# Patient Record
Sex: MALE | Race: WHITE | NOT HISPANIC OR LATINO | Employment: FULL TIME | URBAN - METROPOLITAN AREA
[De-identification: names, ages, dates, MRNs, and addresses within clinical notes are randomized per-mention and may not be internally consistent; named-entity substitution may affect disease eponyms.]

---

## 2017-01-06 ENCOUNTER — AMBULATORY - HEALTHEAST (OUTPATIENT)
Dept: CARDIOLOGY | Facility: CLINIC | Age: 67
End: 2017-01-06

## 2017-01-06 DIAGNOSIS — I23.6 LEFT VENTRICULAR THROMBUS FOLLOWING MI (H): ICD-10-CM

## 2017-01-06 DIAGNOSIS — I47.29 PAROXYSMAL VENTRICULAR TACHYCARDIA (H): ICD-10-CM

## 2017-01-20 ENCOUNTER — AMBULATORY - HEALTHEAST (OUTPATIENT)
Dept: CARDIOLOGY | Facility: CLINIC | Age: 67
End: 2017-01-20

## 2017-01-20 DIAGNOSIS — I47.29 PAROXYSMAL VENTRICULAR TACHYCARDIA (H): ICD-10-CM

## 2017-01-20 DIAGNOSIS — I23.6 LEFT VENTRICULAR THROMBUS FOLLOWING MI (H): ICD-10-CM

## 2017-01-21 ENCOUNTER — COMMUNICATION - HEALTHEAST (OUTPATIENT)
Dept: CARDIOLOGY | Facility: CLINIC | Age: 67
End: 2017-01-21

## 2017-01-23 ENCOUNTER — COMMUNICATION - HEALTHEAST (OUTPATIENT)
Dept: CARDIOLOGY | Facility: CLINIC | Age: 67
End: 2017-01-23

## 2017-01-23 DIAGNOSIS — I48.0 PAROXYSMAL ATRIAL FIBRILLATION (H): ICD-10-CM

## 2017-02-03 ENCOUNTER — AMBULATORY - HEALTHEAST (OUTPATIENT)
Dept: CARDIOLOGY | Facility: CLINIC | Age: 67
End: 2017-02-03

## 2017-02-03 DIAGNOSIS — I23.6 LEFT VENTRICULAR THROMBUS FOLLOWING MI (H): ICD-10-CM

## 2017-02-03 DIAGNOSIS — I48.0 PAROXYSMAL ATRIAL FIBRILLATION (H): ICD-10-CM

## 2017-02-03 DIAGNOSIS — I47.29 PAROXYSMAL VENTRICULAR TACHYCARDIA (H): ICD-10-CM

## 2017-03-01 ENCOUNTER — COMMUNICATION - HEALTHEAST (OUTPATIENT)
Dept: INTERNAL MEDICINE | Facility: CLINIC | Age: 67
End: 2017-03-01

## 2017-03-03 ENCOUNTER — AMBULATORY - HEALTHEAST (OUTPATIENT)
Dept: CARDIOLOGY | Facility: CLINIC | Age: 67
End: 2017-03-03

## 2017-03-03 DIAGNOSIS — I47.29 PAROXYSMAL VENTRICULAR TACHYCARDIA (H): ICD-10-CM

## 2017-03-03 DIAGNOSIS — I23.6 LEFT VENTRICULAR THROMBUS FOLLOWING MI (H): ICD-10-CM

## 2017-03-06 ENCOUNTER — COMMUNICATION - HEALTHEAST (OUTPATIENT)
Dept: INTERNAL MEDICINE | Facility: CLINIC | Age: 67
End: 2017-03-06

## 2017-03-20 ENCOUNTER — COMMUNICATION - HEALTHEAST (OUTPATIENT)
Dept: CARDIOLOGY | Facility: CLINIC | Age: 67
End: 2017-03-20

## 2017-03-23 ENCOUNTER — AMBULATORY - HEALTHEAST (OUTPATIENT)
Dept: CARDIOLOGY | Facility: CLINIC | Age: 67
End: 2017-03-23

## 2017-03-23 ENCOUNTER — OFFICE VISIT - HEALTHEAST (OUTPATIENT)
Dept: CARDIOLOGY | Facility: CLINIC | Age: 67
End: 2017-03-23

## 2017-03-23 DIAGNOSIS — E78.2 MIXED HYPERLIPIDEMIA: ICD-10-CM

## 2017-03-23 DIAGNOSIS — I25.5 ISCHEMIC CARDIOMYOPATHY: ICD-10-CM

## 2017-03-23 DIAGNOSIS — Z95.810 ICD (IMPLANTABLE CARDIOVERTER-DEFIBRILLATOR), DUAL, IN SITU: ICD-10-CM

## 2017-03-23 DIAGNOSIS — I25.10 CORONARY ARTERY DISEASE DUE TO LIPID RICH PLAQUE: ICD-10-CM

## 2017-03-23 DIAGNOSIS — I25.83 CORONARY ARTERY DISEASE DUE TO LIPID RICH PLAQUE: ICD-10-CM

## 2017-03-23 DIAGNOSIS — I50.22 CHRONIC SYSTOLIC CHF (CONGESTIVE HEART FAILURE), NYHA CLASS 2 (H): ICD-10-CM

## 2017-03-23 ASSESSMENT — MIFFLIN-ST. JEOR: SCORE: 1824.06

## 2017-03-31 ENCOUNTER — AMBULATORY - HEALTHEAST (OUTPATIENT)
Dept: CARDIOLOGY | Facility: CLINIC | Age: 67
End: 2017-03-31

## 2017-03-31 DIAGNOSIS — I47.29 PAROXYSMAL VENTRICULAR TACHYCARDIA (H): ICD-10-CM

## 2017-03-31 DIAGNOSIS — I23.6 LEFT VENTRICULAR THROMBUS FOLLOWING MI (H): ICD-10-CM

## 2017-04-28 ENCOUNTER — AMBULATORY - HEALTHEAST (OUTPATIENT)
Dept: CARDIOLOGY | Facility: CLINIC | Age: 67
End: 2017-04-28

## 2017-04-28 DIAGNOSIS — I23.6 LEFT VENTRICULAR THROMBUS FOLLOWING MI (H): ICD-10-CM

## 2017-04-28 DIAGNOSIS — I47.29 PAROXYSMAL VENTRICULAR TACHYCARDIA (H): ICD-10-CM

## 2017-05-12 ENCOUNTER — AMBULATORY - HEALTHEAST (OUTPATIENT)
Dept: CARDIOLOGY | Facility: CLINIC | Age: 67
End: 2017-05-12

## 2017-05-12 DIAGNOSIS — I47.29 PAROXYSMAL VENTRICULAR TACHYCARDIA (H): ICD-10-CM

## 2017-05-12 DIAGNOSIS — I23.6 LEFT VENTRICULAR THROMBUS FOLLOWING MI (H): ICD-10-CM

## 2017-05-22 ENCOUNTER — AMBULATORY - HEALTHEAST (OUTPATIENT)
Dept: CARDIOLOGY | Facility: CLINIC | Age: 67
End: 2017-05-22

## 2017-05-22 DIAGNOSIS — I47.29 PAROXYSMAL VENTRICULAR TACHYCARDIA (H): ICD-10-CM

## 2017-05-22 DIAGNOSIS — I23.6 LEFT VENTRICULAR THROMBUS FOLLOWING MI (H): ICD-10-CM

## 2017-06-15 ENCOUNTER — HOSPITAL ENCOUNTER (OUTPATIENT)
Dept: PHYSICAL MEDICINE AND REHAB | Facility: CLINIC | Age: 67
Discharge: HOME | End: 2017-06-15
Attending: NURSE PRACTITIONER

## 2017-06-15 DIAGNOSIS — M54.16 LUMBAR RADICULOPATHY, CHRONIC: ICD-10-CM

## 2017-06-15 DIAGNOSIS — G89.29 CHRONIC RIGHT-SIDED LOW BACK PAIN WITHOUT SCIATICA: ICD-10-CM

## 2017-06-15 DIAGNOSIS — M54.50 CHRONIC RIGHT-SIDED LOW BACK PAIN WITHOUT SCIATICA: ICD-10-CM

## 2017-06-15 DIAGNOSIS — M43.06 PARS DEFECT OF LUMBAR SPINE: ICD-10-CM

## 2017-06-16 ENCOUNTER — HOSPITAL ENCOUNTER (OUTPATIENT)
Dept: RADIOLOGY | Facility: CLINIC | Age: 67
Discharge: HOME | End: 2017-06-16

## 2017-06-16 ENCOUNTER — HOSPITAL ENCOUNTER (OUTPATIENT)
Dept: CT IMAGING | Facility: CLINIC | Age: 67
Discharge: HOME | End: 2017-06-16

## 2017-06-16 DIAGNOSIS — M54.16 LUMBAR RADICULOPATHY, CHRONIC: ICD-10-CM

## 2017-06-16 DIAGNOSIS — M54.50 CHRONIC RIGHT-SIDED LOW BACK PAIN WITHOUT SCIATICA: ICD-10-CM

## 2017-06-16 DIAGNOSIS — G89.29 CHRONIC RIGHT-SIDED LOW BACK PAIN WITHOUT SCIATICA: ICD-10-CM

## 2017-06-16 DIAGNOSIS — M43.06 PARS DEFECT OF LUMBAR SPINE: ICD-10-CM

## 2017-06-21 ENCOUNTER — AMBULATORY - HEALTHEAST (OUTPATIENT)
Dept: CARDIOLOGY | Facility: CLINIC | Age: 67
End: 2017-06-21

## 2017-06-21 DIAGNOSIS — Z95.810 ICD (IMPLANTABLE CARDIOVERTER-DEFIBRILLATOR), DUAL, IN SITU: ICD-10-CM

## 2017-06-21 LAB — HCC DEVICE COMMENTS: NORMAL

## 2017-06-22 ENCOUNTER — HOSPITAL ENCOUNTER (OUTPATIENT)
Dept: PHYSICAL MEDICINE AND REHAB | Facility: CLINIC | Age: 67
Discharge: HOME | End: 2017-06-22
Attending: NURSE PRACTITIONER

## 2017-06-22 DIAGNOSIS — M54.16 LUMBAR RADICULOPATHY, CHRONIC: ICD-10-CM

## 2017-06-22 DIAGNOSIS — Z79.01 ANTICOAGULANT LONG-TERM USE: ICD-10-CM

## 2017-06-22 DIAGNOSIS — M54.50 CHRONIC RIGHT-SIDED LOW BACK PAIN WITHOUT SCIATICA: ICD-10-CM

## 2017-06-22 DIAGNOSIS — G89.29 CHRONIC RIGHT-SIDED LOW BACK PAIN WITHOUT SCIATICA: ICD-10-CM

## 2017-06-22 DIAGNOSIS — M43.06 PARS DEFECT OF LUMBAR SPINE: ICD-10-CM

## 2017-06-22 DIAGNOSIS — M43.16 SPONDYLOLISTHESIS, LUMBAR REGION: ICD-10-CM

## 2017-06-26 ENCOUNTER — AMBULATORY - HEALTHEAST (OUTPATIENT)
Dept: CARDIOLOGY | Facility: CLINIC | Age: 67
End: 2017-06-26

## 2017-06-26 DIAGNOSIS — I23.6 LEFT VENTRICULAR THROMBUS FOLLOWING MI (H): ICD-10-CM

## 2017-06-26 DIAGNOSIS — I47.29 PAROXYSMAL VENTRICULAR TACHYCARDIA (H): ICD-10-CM

## 2017-07-06 ENCOUNTER — OFFICE VISIT - HEALTHEAST (OUTPATIENT)
Dept: PHYSICAL THERAPY | Facility: CLINIC | Age: 67
End: 2017-07-06

## 2017-07-06 DIAGNOSIS — M54.41 CHRONIC BILATERAL LOW BACK PAIN WITH BILATERAL SCIATICA: ICD-10-CM

## 2017-07-06 DIAGNOSIS — M62.81 GENERALIZED MUSCLE WEAKNESS: ICD-10-CM

## 2017-07-06 DIAGNOSIS — G89.29 CHRONIC BILATERAL LOW BACK PAIN WITH BILATERAL SCIATICA: ICD-10-CM

## 2017-07-06 DIAGNOSIS — M54.42 CHRONIC BILATERAL LOW BACK PAIN WITH BILATERAL SCIATICA: ICD-10-CM

## 2017-07-06 DIAGNOSIS — R29.3 ABNORMAL POSTURE: ICD-10-CM

## 2017-07-10 ENCOUNTER — OFFICE VISIT - HEALTHEAST (OUTPATIENT)
Dept: PHYSICAL THERAPY | Facility: CLINIC | Age: 67
End: 2017-07-10

## 2017-07-10 DIAGNOSIS — G89.29 CHRONIC BILATERAL LOW BACK PAIN WITH BILATERAL SCIATICA: ICD-10-CM

## 2017-07-10 DIAGNOSIS — R29.3 ABNORMAL POSTURE: ICD-10-CM

## 2017-07-10 DIAGNOSIS — M54.41 CHRONIC BILATERAL LOW BACK PAIN WITH BILATERAL SCIATICA: ICD-10-CM

## 2017-07-10 DIAGNOSIS — M62.81 GENERALIZED MUSCLE WEAKNESS: ICD-10-CM

## 2017-07-10 DIAGNOSIS — M54.42 CHRONIC BILATERAL LOW BACK PAIN WITH BILATERAL SCIATICA: ICD-10-CM

## 2017-07-13 ENCOUNTER — OFFICE VISIT - HEALTHEAST (OUTPATIENT)
Dept: PHYSICAL THERAPY | Facility: CLINIC | Age: 67
End: 2017-07-13

## 2017-07-13 DIAGNOSIS — M62.81 GENERALIZED MUSCLE WEAKNESS: ICD-10-CM

## 2017-07-13 DIAGNOSIS — M54.42 CHRONIC BILATERAL LOW BACK PAIN WITH BILATERAL SCIATICA: ICD-10-CM

## 2017-07-13 DIAGNOSIS — G89.29 CHRONIC BILATERAL LOW BACK PAIN WITH BILATERAL SCIATICA: ICD-10-CM

## 2017-07-13 DIAGNOSIS — M54.41 CHRONIC BILATERAL LOW BACK PAIN WITH BILATERAL SCIATICA: ICD-10-CM

## 2017-07-13 DIAGNOSIS — R29.3 ABNORMAL POSTURE: ICD-10-CM

## 2017-07-17 ENCOUNTER — OFFICE VISIT - HEALTHEAST (OUTPATIENT)
Dept: PHYSICAL THERAPY | Facility: CLINIC | Age: 67
End: 2017-07-17

## 2017-07-17 DIAGNOSIS — G89.29 CHRONIC BILATERAL LOW BACK PAIN WITH BILATERAL SCIATICA: ICD-10-CM

## 2017-07-17 DIAGNOSIS — M54.42 CHRONIC BILATERAL LOW BACK PAIN WITH BILATERAL SCIATICA: ICD-10-CM

## 2017-07-17 DIAGNOSIS — M54.41 CHRONIC BILATERAL LOW BACK PAIN WITH BILATERAL SCIATICA: ICD-10-CM

## 2017-07-17 DIAGNOSIS — R29.3 ABNORMAL POSTURE: ICD-10-CM

## 2017-07-17 DIAGNOSIS — M62.81 GENERALIZED MUSCLE WEAKNESS: ICD-10-CM

## 2017-07-27 ENCOUNTER — OFFICE VISIT - HEALTHEAST (OUTPATIENT)
Dept: PHYSICAL THERAPY | Facility: CLINIC | Age: 67
End: 2017-07-27

## 2017-07-27 DIAGNOSIS — R29.3 ABNORMAL POSTURE: ICD-10-CM

## 2017-07-27 DIAGNOSIS — G89.29 CHRONIC BILATERAL LOW BACK PAIN WITH BILATERAL SCIATICA: ICD-10-CM

## 2017-07-27 DIAGNOSIS — M54.42 CHRONIC BILATERAL LOW BACK PAIN WITH BILATERAL SCIATICA: ICD-10-CM

## 2017-07-27 DIAGNOSIS — M54.41 CHRONIC BILATERAL LOW BACK PAIN WITH BILATERAL SCIATICA: ICD-10-CM

## 2017-07-27 DIAGNOSIS — M62.81 GENERALIZED MUSCLE WEAKNESS: ICD-10-CM

## 2017-07-28 ENCOUNTER — AMBULATORY - HEALTHEAST (OUTPATIENT)
Dept: CARDIOLOGY | Facility: CLINIC | Age: 67
End: 2017-07-28

## 2017-07-28 DIAGNOSIS — I47.29 PAROXYSMAL VENTRICULAR TACHYCARDIA (H): ICD-10-CM

## 2017-07-28 DIAGNOSIS — I23.6 LEFT VENTRICULAR THROMBUS FOLLOWING MI (H): ICD-10-CM

## 2017-08-10 ENCOUNTER — OFFICE VISIT - HEALTHEAST (OUTPATIENT)
Dept: PHYSICAL THERAPY | Facility: CLINIC | Age: 67
End: 2017-08-10

## 2017-08-10 DIAGNOSIS — M62.81 GENERALIZED MUSCLE WEAKNESS: ICD-10-CM

## 2017-08-10 DIAGNOSIS — R29.3 ABNORMAL POSTURE: ICD-10-CM

## 2017-08-10 DIAGNOSIS — M54.42 CHRONIC BILATERAL LOW BACK PAIN WITH BILATERAL SCIATICA: ICD-10-CM

## 2017-08-10 DIAGNOSIS — G89.29 CHRONIC BILATERAL LOW BACK PAIN WITH BILATERAL SCIATICA: ICD-10-CM

## 2017-08-10 DIAGNOSIS — M54.41 CHRONIC BILATERAL LOW BACK PAIN WITH BILATERAL SCIATICA: ICD-10-CM

## 2017-08-25 ENCOUNTER — AMBULATORY - HEALTHEAST (OUTPATIENT)
Dept: CARDIOLOGY | Facility: CLINIC | Age: 67
End: 2017-08-25

## 2017-08-25 DIAGNOSIS — I47.29 PAROXYSMAL VENTRICULAR TACHYCARDIA (H): ICD-10-CM

## 2017-08-25 DIAGNOSIS — I23.6 LEFT VENTRICULAR THROMBUS FOLLOWING MI (H): ICD-10-CM

## 2017-08-30 ENCOUNTER — COMMUNICATION - HEALTHEAST (OUTPATIENT)
Dept: INTERNAL MEDICINE | Facility: CLINIC | Age: 67
End: 2017-08-30

## 2017-08-30 ENCOUNTER — COMMUNICATION - HEALTHEAST (OUTPATIENT)
Dept: CARDIOLOGY | Facility: CLINIC | Age: 67
End: 2017-08-30

## 2017-08-30 DIAGNOSIS — I25.5 ISCHEMIC CARDIOMYOPATHY: ICD-10-CM

## 2017-09-08 ENCOUNTER — AMBULATORY - HEALTHEAST (OUTPATIENT)
Dept: CARDIOLOGY | Facility: CLINIC | Age: 67
End: 2017-09-08

## 2017-09-08 DIAGNOSIS — I47.29 PAROXYSMAL VENTRICULAR TACHYCARDIA (H): ICD-10-CM

## 2017-09-08 DIAGNOSIS — I23.6 LEFT VENTRICULAR THROMBUS FOLLOWING MI (H): ICD-10-CM

## 2017-09-22 ENCOUNTER — COMMUNICATION - HEALTHEAST (OUTPATIENT)
Dept: PHYSICAL MEDICINE AND REHAB | Facility: CLINIC | Age: 67
End: 2017-09-22

## 2017-09-25 ENCOUNTER — AMBULATORY - HEALTHEAST (OUTPATIENT)
Dept: PHYSICAL MEDICINE AND REHAB | Facility: CLINIC | Age: 67
End: 2017-09-25

## 2017-09-25 DIAGNOSIS — M43.16 SPONDYLOLISTHESIS, LUMBAR REGION: ICD-10-CM

## 2017-09-25 DIAGNOSIS — M43.06 PARS DEFECT OF LUMBAR SPINE: ICD-10-CM

## 2017-09-25 DIAGNOSIS — M54.50 CHRONIC RIGHT-SIDED LOW BACK PAIN WITHOUT SCIATICA: ICD-10-CM

## 2017-09-25 DIAGNOSIS — G89.29 CHRONIC RIGHT-SIDED LOW BACK PAIN WITHOUT SCIATICA: ICD-10-CM

## 2017-09-26 ENCOUNTER — RECORDS - HEALTHEAST (OUTPATIENT)
Dept: ADMINISTRATIVE | Facility: OTHER | Age: 67
End: 2017-09-26

## 2017-09-27 ENCOUNTER — AMBULATORY - HEALTHEAST (OUTPATIENT)
Dept: CARDIOLOGY | Facility: CLINIC | Age: 67
End: 2017-09-27

## 2017-09-27 DIAGNOSIS — Z95.810 ICD (IMPLANTABLE CARDIOVERTER-DEFIBRILLATOR), DUAL, IN SITU: ICD-10-CM

## 2017-09-27 LAB — HCC DEVICE COMMENTS: NORMAL

## 2017-09-29 ENCOUNTER — COMMUNICATION - HEALTHEAST (OUTPATIENT)
Dept: CARDIOLOGY | Facility: CLINIC | Age: 67
End: 2017-09-29

## 2017-09-29 ENCOUNTER — HOSPITAL ENCOUNTER (OUTPATIENT)
Dept: ULTRASOUND IMAGING | Facility: CLINIC | Age: 67
Discharge: HOME | End: 2017-09-29
Attending: PHYSICIAN ASSISTANT

## 2017-09-29 ENCOUNTER — OFFICE VISIT - HEALTHEAST (OUTPATIENT)
Dept: CARDIOLOGY | Facility: CLINIC | Age: 67
End: 2017-09-29

## 2017-09-29 DIAGNOSIS — M54.50 LOW BACK PAIN: ICD-10-CM

## 2017-09-29 DIAGNOSIS — I73.9 VASCULAR CLAUDICATION (H): ICD-10-CM

## 2017-09-29 DIAGNOSIS — I25.83 CORONARY ARTERY DISEASE DUE TO LIPID RICH PLAQUE: ICD-10-CM

## 2017-09-29 DIAGNOSIS — I25.10 CORONARY ARTERY DISEASE DUE TO LIPID RICH PLAQUE: ICD-10-CM

## 2017-09-29 DIAGNOSIS — I25.5 CARDIOMYOPATHY, ISCHEMIC: ICD-10-CM

## 2017-10-04 ENCOUNTER — RECORDS - HEALTHEAST (OUTPATIENT)
Dept: ADMINISTRATIVE | Facility: OTHER | Age: 67
End: 2017-10-04

## 2017-10-06 ENCOUNTER — AMBULATORY - HEALTHEAST (OUTPATIENT)
Dept: CARDIOLOGY | Facility: CLINIC | Age: 67
End: 2017-10-06

## 2017-10-06 DIAGNOSIS — I47.29 PAROXYSMAL VENTRICULAR TACHYCARDIA (H): ICD-10-CM

## 2017-10-06 DIAGNOSIS — I23.6 LEFT VENTRICULAR THROMBUS FOLLOWING MI (H): ICD-10-CM

## 2017-10-13 ENCOUNTER — AMBULATORY - HEALTHEAST (OUTPATIENT)
Dept: CARDIOLOGY | Facility: CLINIC | Age: 67
End: 2017-10-13

## 2017-10-13 DIAGNOSIS — I47.29 PAROXYSMAL VENTRICULAR TACHYCARDIA (H): ICD-10-CM

## 2017-10-13 DIAGNOSIS — I23.6 LEFT VENTRICULAR THROMBUS FOLLOWING MI (H): ICD-10-CM

## 2017-10-21 ENCOUNTER — COMMUNICATION - HEALTHEAST (OUTPATIENT)
Dept: INTERNAL MEDICINE | Facility: CLINIC | Age: 67
End: 2017-10-21

## 2017-10-21 DIAGNOSIS — I25.10 CAD (CORONARY ARTERY DISEASE): ICD-10-CM

## 2017-10-30 ENCOUNTER — RECORDS - HEALTHEAST (OUTPATIENT)
Dept: ADMINISTRATIVE | Facility: OTHER | Age: 67
End: 2017-10-30

## 2017-11-03 ENCOUNTER — AMBULATORY - HEALTHEAST (OUTPATIENT)
Dept: CARDIOLOGY | Facility: CLINIC | Age: 67
End: 2017-11-03

## 2017-11-03 DIAGNOSIS — I23.6 LEFT VENTRICULAR THROMBUS FOLLOWING MI (H): ICD-10-CM

## 2017-11-03 DIAGNOSIS — I47.29 PAROXYSMAL VENTRICULAR TACHYCARDIA (H): ICD-10-CM

## 2017-11-17 ENCOUNTER — AMBULATORY - HEALTHEAST (OUTPATIENT)
Dept: CARDIOLOGY | Facility: CLINIC | Age: 67
End: 2017-11-17

## 2017-11-17 DIAGNOSIS — I47.29 PAROXYSMAL VENTRICULAR TACHYCARDIA (H): ICD-10-CM

## 2017-11-17 DIAGNOSIS — I23.6 LEFT VENTRICULAR THROMBUS FOLLOWING MI (H): ICD-10-CM

## 2017-12-08 ENCOUNTER — AMBULATORY - HEALTHEAST (OUTPATIENT)
Dept: CARDIOLOGY | Facility: CLINIC | Age: 67
End: 2017-12-08

## 2017-12-08 DIAGNOSIS — I23.6 LEFT VENTRICULAR THROMBUS FOLLOWING MI (H): ICD-10-CM

## 2017-12-08 DIAGNOSIS — I47.29 PAROXYSMAL VENTRICULAR TACHYCARDIA (H): ICD-10-CM

## 2017-12-16 ENCOUNTER — COMMUNICATION - HEALTHEAST (OUTPATIENT)
Dept: CARDIOLOGY | Facility: CLINIC | Age: 67
End: 2017-12-16

## 2018-01-04 ENCOUNTER — AMBULATORY - HEALTHEAST (OUTPATIENT)
Dept: CARDIOLOGY | Facility: CLINIC | Age: 68
End: 2018-01-04

## 2018-01-04 DIAGNOSIS — Z95.810 ICD (IMPLANTABLE CARDIOVERTER-DEFIBRILLATOR), DUAL, IN SITU: ICD-10-CM

## 2018-01-04 LAB — HCC DEVICE COMMENTS: NORMAL

## 2018-01-08 ENCOUNTER — AMBULATORY - HEALTHEAST (OUTPATIENT)
Dept: CARDIOLOGY | Facility: CLINIC | Age: 68
End: 2018-01-08

## 2018-01-08 DIAGNOSIS — I47.29 PAROXYSMAL VENTRICULAR TACHYCARDIA (H): ICD-10-CM

## 2018-01-08 DIAGNOSIS — I23.6 LEFT VENTRICULAR THROMBUS FOLLOWING MI (H): ICD-10-CM

## 2018-01-08 LAB — INTERNATIONAL NORMALIZATION RATIO, POC - HISTORICAL: 3

## 2018-02-05 ENCOUNTER — AMBULATORY - HEALTHEAST (OUTPATIENT)
Dept: CARDIOLOGY | Facility: CLINIC | Age: 68
End: 2018-02-05

## 2018-02-05 ENCOUNTER — COMMUNICATION - HEALTHEAST (OUTPATIENT)
Dept: CARDIOLOGY | Facility: CLINIC | Age: 68
End: 2018-02-05

## 2018-02-05 DIAGNOSIS — I23.6 LEFT VENTRICULAR THROMBUS FOLLOWING MI (H): ICD-10-CM

## 2018-02-05 DIAGNOSIS — I48.0 PAROXYSMAL ATRIAL FIBRILLATION (H): ICD-10-CM

## 2018-02-05 DIAGNOSIS — I47.29 PAROXYSMAL VENTRICULAR TACHYCARDIA (H): ICD-10-CM

## 2018-02-05 LAB — INTERNATIONAL NORMALIZATION RATIO, POC - HISTORICAL: 2

## 2018-02-12 ENCOUNTER — COMMUNICATION - HEALTHEAST (OUTPATIENT)
Dept: CARDIOLOGY | Facility: CLINIC | Age: 68
End: 2018-02-12

## 2018-02-12 DIAGNOSIS — I25.10 CAD (CORONARY ARTERY DISEASE): ICD-10-CM

## 2018-02-19 ENCOUNTER — RECORDS - HEALTHEAST (OUTPATIENT)
Dept: ADMINISTRATIVE | Facility: OTHER | Age: 68
End: 2018-02-19

## 2018-02-24 ENCOUNTER — COMMUNICATION - HEALTHEAST (OUTPATIENT)
Dept: INTERNAL MEDICINE | Facility: CLINIC | Age: 68
End: 2018-02-24

## 2018-03-01 ENCOUNTER — AMBULATORY - HEALTHEAST (OUTPATIENT)
Dept: INTERNAL MEDICINE | Facility: CLINIC | Age: 68
End: 2018-03-01

## 2018-03-01 DIAGNOSIS — M51.9 LUMBAR DISC DISEASE: ICD-10-CM

## 2018-03-02 ENCOUNTER — AMBULATORY - HEALTHEAST (OUTPATIENT)
Dept: CARDIOLOGY | Facility: CLINIC | Age: 68
End: 2018-03-02

## 2018-03-02 DIAGNOSIS — I47.29 PAROXYSMAL VENTRICULAR TACHYCARDIA (H): ICD-10-CM

## 2018-03-02 DIAGNOSIS — I23.6 LEFT VENTRICULAR THROMBUS FOLLOWING MI (H): ICD-10-CM

## 2018-03-02 LAB — INTERNATIONAL NORMALIZATION RATIO, POC - HISTORICAL: 1.5

## 2018-03-08 ENCOUNTER — COMMUNICATION - HEALTHEAST (OUTPATIENT)
Dept: INTERNAL MEDICINE | Facility: CLINIC | Age: 68
End: 2018-03-08

## 2018-03-09 ENCOUNTER — COMMUNICATION - HEALTHEAST (OUTPATIENT)
Dept: INTERNAL MEDICINE | Facility: CLINIC | Age: 68
End: 2018-03-09

## 2018-03-16 ENCOUNTER — AMBULATORY - HEALTHEAST (OUTPATIENT)
Dept: CARDIOLOGY | Facility: CLINIC | Age: 68
End: 2018-03-16

## 2018-03-16 DIAGNOSIS — I47.29 PAROXYSMAL VENTRICULAR TACHYCARDIA (H): ICD-10-CM

## 2018-03-16 DIAGNOSIS — I23.6 LEFT VENTRICULAR THROMBUS FOLLOWING MI (H): ICD-10-CM

## 2018-03-16 LAB — INTERNATIONAL NORMALIZATION RATIO, POC - HISTORICAL: 2.4

## 2018-03-21 ENCOUNTER — AMBULATORY - HEALTHEAST (OUTPATIENT)
Dept: CARDIOLOGY | Facility: CLINIC | Age: 68
End: 2018-03-21

## 2018-03-21 DIAGNOSIS — Z95.810 ICD (IMPLANTABLE CARDIOVERTER-DEFIBRILLATOR), DUAL, IN SITU: ICD-10-CM

## 2018-03-21 LAB — HCC DEVICE COMMENTS: NORMAL

## 2018-03-21 ASSESSMENT — MIFFLIN-ST. JEOR: SCORE: 1801.38

## 2018-03-27 ENCOUNTER — COMMUNICATION - HEALTHEAST (OUTPATIENT)
Dept: INTERNAL MEDICINE | Facility: CLINIC | Age: 68
End: 2018-03-27

## 2018-03-29 ENCOUNTER — COMMUNICATION - HEALTHEAST (OUTPATIENT)
Dept: INTERNAL MEDICINE | Facility: CLINIC | Age: 68
End: 2018-03-29

## 2018-04-04 ENCOUNTER — COMMUNICATION - HEALTHEAST (OUTPATIENT)
Dept: INTERNAL MEDICINE | Facility: CLINIC | Age: 68
End: 2018-04-04

## 2018-04-13 ENCOUNTER — COMMUNICATION - HEALTHEAST (OUTPATIENT)
Dept: CARDIOLOGY | Facility: CLINIC | Age: 68
End: 2018-04-13

## 2018-04-13 ENCOUNTER — AMBULATORY - HEALTHEAST (OUTPATIENT)
Dept: CARDIOLOGY | Facility: CLINIC | Age: 68
End: 2018-04-13

## 2018-04-13 DIAGNOSIS — I23.6 LEFT VENTRICULAR THROMBUS FOLLOWING MI (H): ICD-10-CM

## 2018-04-13 DIAGNOSIS — I47.29 PAROXYSMAL VENTRICULAR TACHYCARDIA (H): ICD-10-CM

## 2018-04-13 LAB — INTERNATIONAL NORMALIZATION RATIO, POC - HISTORICAL: 2

## 2018-04-17 ENCOUNTER — OFFICE VISIT - HEALTHEAST (OUTPATIENT)
Dept: INTERNAL MEDICINE | Facility: CLINIC | Age: 68
End: 2018-04-17

## 2018-04-17 DIAGNOSIS — R52 BODY ACHES: ICD-10-CM

## 2018-04-17 DIAGNOSIS — J10.1 INFLUENZA B: ICD-10-CM

## 2018-04-17 DIAGNOSIS — I50.22 CHRONIC SYSTOLIC CHF (CONGESTIVE HEART FAILURE), NYHA CLASS 2 (H): ICD-10-CM

## 2018-04-17 DIAGNOSIS — I23.6 LEFT VENTRICULAR THROMBUS FOLLOWING MI (H): ICD-10-CM

## 2018-04-17 DIAGNOSIS — Z12.11 SCREEN FOR COLON CANCER: ICD-10-CM

## 2018-04-17 LAB
FLUAV AG SPEC QL IA: ABNORMAL
FLUBV AG SPEC QL IA: ABNORMAL

## 2018-04-17 ASSESSMENT — MIFFLIN-ST. JEOR: SCORE: 1787.77

## 2018-04-23 ENCOUNTER — COMMUNICATION - HEALTHEAST (OUTPATIENT)
Dept: INTERNAL MEDICINE | Facility: CLINIC | Age: 68
End: 2018-04-23

## 2018-04-23 DIAGNOSIS — I25.10 CAD (CORONARY ARTERY DISEASE): ICD-10-CM

## 2018-05-10 ENCOUNTER — COMMUNICATION - HEALTHEAST (OUTPATIENT)
Dept: CARDIOLOGY | Facility: CLINIC | Age: 68
End: 2018-05-10

## 2018-05-10 ENCOUNTER — AMBULATORY - HEALTHEAST (OUTPATIENT)
Dept: INTERNAL MEDICINE | Facility: CLINIC | Age: 68
End: 2018-05-10

## 2018-05-10 DIAGNOSIS — I48.0 PAROXYSMAL ATRIAL FIBRILLATION (H): ICD-10-CM

## 2018-05-11 ENCOUNTER — AMBULATORY - HEALTHEAST (OUTPATIENT)
Dept: CARDIOLOGY | Facility: CLINIC | Age: 68
End: 2018-05-11

## 2018-05-11 ENCOUNTER — OFFICE VISIT - HEALTHEAST (OUTPATIENT)
Dept: INTERNAL MEDICINE | Facility: CLINIC | Age: 68
End: 2018-05-11

## 2018-05-11 DIAGNOSIS — I23.6 LEFT VENTRICULAR THROMBUS FOLLOWING MI (H): ICD-10-CM

## 2018-05-11 DIAGNOSIS — I10 BENIGN ESSENTIAL HTN: ICD-10-CM

## 2018-05-11 DIAGNOSIS — Z01.818 PREOP EXAMINATION: ICD-10-CM

## 2018-05-11 DIAGNOSIS — I23.6: ICD-10-CM

## 2018-05-11 DIAGNOSIS — I47.29 PAROXYSMAL VENTRICULAR TACHYCARDIA (H): ICD-10-CM

## 2018-05-11 LAB
ALBUMIN SERPL-MCNC: 3.6 G/DL (ref 3.5–5)
ALP SERPL-CCNC: 71 U/L (ref 45–120)
ALT SERPL W P-5'-P-CCNC: 39 U/L (ref 0–45)
ANION GAP SERPL CALCULATED.3IONS-SCNC: 8 MMOL/L (ref 5–18)
AST SERPL W P-5'-P-CCNC: 17 U/L (ref 0–40)
ATRIAL RATE - MUSE: 64 BPM
BILIRUB SERPL-MCNC: 0.9 MG/DL (ref 0–1)
BUN SERPL-MCNC: 16 MG/DL (ref 8–22)
CALCIUM SERPL-MCNC: 9.4 MG/DL (ref 8.5–10.5)
CHLORIDE BLD-SCNC: 105 MMOL/L (ref 98–107)
CO2 SERPL-SCNC: 29 MMOL/L (ref 22–31)
CREAT SERPL-MCNC: 0.97 MG/DL (ref 0.7–1.3)
DIASTOLIC BLOOD PRESSURE - MUSE: NORMAL MMHG
ERYTHROCYTE [DISTWIDTH] IN BLOOD BY AUTOMATED COUNT: 12.3 % (ref 11–14.5)
GFR SERPL CREATININE-BSD FRML MDRD: >60 ML/MIN/1.73M2
GLUCOSE BLD-MCNC: 95 MG/DL (ref 70–125)
HCT VFR BLD AUTO: 47.5 % (ref 40–54)
HGB BLD-MCNC: 15.5 G/DL (ref 14–18)
INR PPP: 1.76 (ref 0.9–1.1)
INTERPRETATION ECG - MUSE: NORMAL
MCH RBC QN AUTO: 28.1 PG (ref 27–34)
MCHC RBC AUTO-ENTMCNC: 32.7 G/DL (ref 32–36)
MCV RBC AUTO: 86 FL (ref 80–100)
P AXIS - MUSE: -12 DEGREES
PLATELET # BLD AUTO: 171 THOU/UL (ref 140–440)
PMV BLD AUTO: 7.4 FL (ref 7–10)
POTASSIUM BLD-SCNC: 4.5 MMOL/L (ref 3.5–5)
PR INTERVAL - MUSE: 136 MS
PROT SERPL-MCNC: 7.2 G/DL (ref 6–8)
QRS DURATION - MUSE: 98 MS
QT - MUSE: 394 MS
QTC - MUSE: 406 MS
R AXIS - MUSE: 80 DEGREES
RBC # BLD AUTO: 5.52 MILL/UL (ref 4.4–6.2)
SODIUM SERPL-SCNC: 142 MMOL/L (ref 136–145)
SYSTOLIC BLOOD PRESSURE - MUSE: NORMAL MMHG
T AXIS - MUSE: 49 DEGREES
VENTRICULAR RATE- MUSE: 64 BPM
WBC: 5 THOU/UL (ref 4–11)

## 2018-05-11 ASSESSMENT — MIFFLIN-ST. JEOR: SCORE: 1787.77

## 2018-05-16 ENCOUNTER — COMMUNICATION - HEALTHEAST (OUTPATIENT)
Dept: INTERNAL MEDICINE | Facility: CLINIC | Age: 68
End: 2018-05-16

## 2018-06-04 ENCOUNTER — RECORDS - HEALTHEAST (OUTPATIENT)
Dept: ADMINISTRATIVE | Facility: OTHER | Age: 68
End: 2018-06-04

## 2018-06-04 ENCOUNTER — HOME CARE/HOSPICE - HEALTHEAST (OUTPATIENT)
Dept: HOME HEALTH SERVICES | Facility: HOME HEALTH | Age: 68
End: 2018-06-04

## 2018-06-06 ENCOUNTER — AMBULATORY - HEALTHEAST (OUTPATIENT)
Dept: CARDIOLOGY | Facility: CLINIC | Age: 68
End: 2018-06-06

## 2018-06-06 ENCOUNTER — HOME CARE/HOSPICE - HEALTHEAST (OUTPATIENT)
Dept: HOME HEALTH SERVICES | Facility: HOME HEALTH | Age: 68
End: 2018-06-06

## 2018-06-06 ENCOUNTER — COMMUNICATION - HEALTHEAST (OUTPATIENT)
Dept: INTERNAL MEDICINE | Facility: CLINIC | Age: 68
End: 2018-06-06

## 2018-06-06 DIAGNOSIS — I47.29 PAROXYSMAL VENTRICULAR TACHYCARDIA (H): ICD-10-CM

## 2018-06-06 DIAGNOSIS — I23.6 LEFT VENTRICULAR THROMBUS FOLLOWING MI (H): ICD-10-CM

## 2018-06-06 LAB — INTERNATIONAL NORMALIZATION RATIO, POC - HISTORICAL: 1

## 2018-06-08 ENCOUNTER — HOME CARE/HOSPICE - HEALTHEAST (OUTPATIENT)
Dept: HOME HEALTH SERVICES | Facility: HOME HEALTH | Age: 68
End: 2018-06-08

## 2018-06-08 ENCOUNTER — COMMUNICATION - HEALTHEAST (OUTPATIENT)
Dept: PHYSICAL THERAPY | Age: 68
End: 2018-06-08

## 2018-06-08 ENCOUNTER — COMMUNICATION - HEALTHEAST (OUTPATIENT)
Dept: CARDIOLOGY | Facility: CLINIC | Age: 68
End: 2018-06-08

## 2018-06-09 ENCOUNTER — HOME CARE/HOSPICE - HEALTHEAST (OUTPATIENT)
Dept: HOME HEALTH SERVICES | Facility: HOME HEALTH | Age: 68
End: 2018-06-09

## 2018-06-11 ENCOUNTER — OFFICE VISIT - HEALTHEAST (OUTPATIENT)
Dept: CARDIOLOGY | Facility: CLINIC | Age: 68
End: 2018-06-11

## 2018-06-11 DIAGNOSIS — I25.5 ISCHEMIC CARDIOMYOPATHY: ICD-10-CM

## 2018-06-11 DIAGNOSIS — I23.6 LEFT VENTRICULAR THROMBUS FOLLOWING MI (H): ICD-10-CM

## 2018-06-11 DIAGNOSIS — Z95.810 ICD (IMPLANTABLE CARDIOVERTER-DEFIBRILLATOR), DUAL, IN SITU: ICD-10-CM

## 2018-06-11 DIAGNOSIS — I10 ORTHOSTATIC HYPERTENSION: ICD-10-CM

## 2018-06-11 ASSESSMENT — MIFFLIN-ST. JEOR: SCORE: 1774.16

## 2018-06-12 ENCOUNTER — HOME CARE/HOSPICE - HEALTHEAST (OUTPATIENT)
Dept: HOME HEALTH SERVICES | Facility: HOME HEALTH | Age: 68
End: 2018-06-12

## 2018-06-12 ENCOUNTER — AMBULATORY - HEALTHEAST (OUTPATIENT)
Dept: CARDIOLOGY | Facility: CLINIC | Age: 68
End: 2018-06-12

## 2018-06-12 DIAGNOSIS — I23.6 LEFT VENTRICULAR THROMBUS FOLLOWING MI (H): ICD-10-CM

## 2018-06-12 DIAGNOSIS — I47.29 PAROXYSMAL VENTRICULAR TACHYCARDIA (H): ICD-10-CM

## 2018-06-12 LAB — INTERNATIONAL NORMALIZATION RATIO, POC - HISTORICAL: 2.6

## 2018-06-14 ENCOUNTER — AMBULATORY - HEALTHEAST (OUTPATIENT)
Dept: CARDIOLOGY | Facility: CLINIC | Age: 68
End: 2018-06-14

## 2018-06-14 ENCOUNTER — HOME CARE/HOSPICE - HEALTHEAST (OUTPATIENT)
Dept: HOME HEALTH SERVICES | Facility: HOME HEALTH | Age: 68
End: 2018-06-14

## 2018-06-14 DIAGNOSIS — Z95.810 ICD (IMPLANTABLE CARDIOVERTER-DEFIBRILLATOR), DUAL, IN SITU: ICD-10-CM

## 2018-06-14 LAB
HCC DEVICE COMMENTS: NORMAL
HCC DEVICE IMPLANTING PROVIDER: NORMAL
HCC DEVICE MANUFACTURE: NORMAL
HCC DEVICE MODEL: NORMAL
HCC DEVICE SERIAL NUMBER: NORMAL
HCC DEVICE TYPE: NORMAL

## 2018-06-15 ENCOUNTER — HOME CARE/HOSPICE - HEALTHEAST (OUTPATIENT)
Dept: HOME HEALTH SERVICES | Facility: HOME HEALTH | Age: 68
End: 2018-06-15

## 2018-06-16 ENCOUNTER — COMMUNICATION - HEALTHEAST (OUTPATIENT)
Dept: CARDIOLOGY | Facility: CLINIC | Age: 68
End: 2018-06-16

## 2018-06-18 ENCOUNTER — HOME CARE/HOSPICE - HEALTHEAST (OUTPATIENT)
Dept: HOME HEALTH SERVICES | Facility: HOME HEALTH | Age: 68
End: 2018-06-18

## 2018-06-19 ENCOUNTER — HOME CARE/HOSPICE - HEALTHEAST (OUTPATIENT)
Dept: HOME HEALTH SERVICES | Facility: HOME HEALTH | Age: 68
End: 2018-06-19

## 2018-06-19 ENCOUNTER — OFFICE VISIT - HEALTHEAST (OUTPATIENT)
Dept: INTERNAL MEDICINE | Facility: CLINIC | Age: 68
End: 2018-06-19

## 2018-06-19 DIAGNOSIS — I25.5 CARDIOMYOPATHY, ISCHEMIC: ICD-10-CM

## 2018-06-19 DIAGNOSIS — I10 BENIGN ESSENTIAL HTN: ICD-10-CM

## 2018-06-19 DIAGNOSIS — M54.50 LOW BACK PAIN: ICD-10-CM

## 2018-06-19 DIAGNOSIS — R61 NIGHT SWEATS: ICD-10-CM

## 2018-06-19 LAB
ALBUMIN SERPL-MCNC: 3.6 G/DL (ref 3.5–5)
ALP SERPL-CCNC: 76 U/L (ref 45–120)
ALT SERPL W P-5'-P-CCNC: 24 U/L (ref 0–45)
ANION GAP SERPL CALCULATED.3IONS-SCNC: 12 MMOL/L (ref 5–18)
AST SERPL W P-5'-P-CCNC: 15 U/L (ref 0–40)
BILIRUB SERPL-MCNC: 0.4 MG/DL (ref 0–1)
BUN SERPL-MCNC: 17 MG/DL (ref 8–22)
CALCIUM SERPL-MCNC: 9.4 MG/DL (ref 8.5–10.5)
CHLORIDE BLD-SCNC: 105 MMOL/L (ref 98–107)
CO2 SERPL-SCNC: 24 MMOL/L (ref 22–31)
CREAT SERPL-MCNC: 0.95 MG/DL (ref 0.7–1.3)
ERYTHROCYTE [DISTWIDTH] IN BLOOD BY AUTOMATED COUNT: 12.7 % (ref 11–14.5)
ERYTHROCYTE [SEDIMENTATION RATE] IN BLOOD BY WESTERGREN METHOD: 19 MM/HR (ref 0–15)
GFR SERPL CREATININE-BSD FRML MDRD: >60 ML/MIN/1.73M2
GLUCOSE BLD-MCNC: 85 MG/DL (ref 70–125)
HCT VFR BLD AUTO: 41.3 % (ref 40–54)
HGB BLD-MCNC: 13.7 G/DL (ref 14–18)
MCH RBC QN AUTO: 28.5 PG (ref 27–34)
MCHC RBC AUTO-ENTMCNC: 33.1 G/DL (ref 32–36)
MCV RBC AUTO: 86 FL (ref 80–100)
PLATELET # BLD AUTO: 404 THOU/UL (ref 140–440)
PMV BLD AUTO: 6.8 FL (ref 7–10)
POTASSIUM BLD-SCNC: 4 MMOL/L (ref 3.5–5)
PROT SERPL-MCNC: 7.2 G/DL (ref 6–8)
RBC # BLD AUTO: 4.8 MILL/UL (ref 4.4–6.2)
SODIUM SERPL-SCNC: 141 MMOL/L (ref 136–145)
WBC: 6.8 THOU/UL (ref 4–11)

## 2018-06-19 ASSESSMENT — MIFFLIN-ST. JEOR: SCORE: 1778.7

## 2018-06-20 ENCOUNTER — HOME CARE/HOSPICE - HEALTHEAST (OUTPATIENT)
Dept: HOME HEALTH SERVICES | Facility: HOME HEALTH | Age: 68
End: 2018-06-20

## 2018-06-21 ENCOUNTER — HOME CARE/HOSPICE - HEALTHEAST (OUTPATIENT)
Dept: HOME HEALTH SERVICES | Facility: HOME HEALTH | Age: 68
End: 2018-06-21

## 2018-06-22 ENCOUNTER — HOME CARE/HOSPICE - HEALTHEAST (OUTPATIENT)
Dept: HOME HEALTH SERVICES | Facility: HOME HEALTH | Age: 68
End: 2018-06-22

## 2018-06-26 ENCOUNTER — OFFICE VISIT - HEALTHEAST (OUTPATIENT)
Dept: INTERNAL MEDICINE | Facility: CLINIC | Age: 68
End: 2018-06-26

## 2018-06-26 DIAGNOSIS — R61 NIGHT SWEATS: ICD-10-CM

## 2018-06-26 DIAGNOSIS — M51.369 DDD (DEGENERATIVE DISC DISEASE), LUMBAR: ICD-10-CM

## 2018-06-26 ASSESSMENT — MIFFLIN-ST. JEOR: SCORE: 1774.16

## 2018-06-27 ENCOUNTER — HOME CARE/HOSPICE - HEALTHEAST (OUTPATIENT)
Dept: HOME HEALTH SERVICES | Facility: HOME HEALTH | Age: 68
End: 2018-06-27

## 2018-06-29 ENCOUNTER — AMBULATORY - HEALTHEAST (OUTPATIENT)
Dept: CARDIOLOGY | Facility: CLINIC | Age: 68
End: 2018-06-29

## 2018-06-29 DIAGNOSIS — I23.6 LEFT VENTRICULAR THROMBUS FOLLOWING MI (H): ICD-10-CM

## 2018-06-29 DIAGNOSIS — I47.29 PAROXYSMAL VENTRICULAR TACHYCARDIA (H): ICD-10-CM

## 2018-06-29 LAB — INTERNATIONAL NORMALIZATION RATIO, POC - HISTORICAL: 2.2

## 2018-07-09 ENCOUNTER — RECORDS - HEALTHEAST (OUTPATIENT)
Dept: ADMINISTRATIVE | Facility: OTHER | Age: 68
End: 2018-07-09

## 2018-07-27 ENCOUNTER — AMBULATORY - HEALTHEAST (OUTPATIENT)
Dept: CARDIOLOGY | Facility: CLINIC | Age: 68
End: 2018-07-27

## 2018-07-27 DIAGNOSIS — I47.29 PAROXYSMAL VENTRICULAR TACHYCARDIA (H): ICD-10-CM

## 2018-07-27 DIAGNOSIS — I23.6 LEFT VENTRICULAR THROMBUS FOLLOWING MI (H): ICD-10-CM

## 2018-07-27 LAB — INTERNATIONAL NORMALIZATION RATIO, POC - HISTORICAL: 3.5

## 2018-08-24 ENCOUNTER — AMBULATORY - HEALTHEAST (OUTPATIENT)
Dept: CARDIOLOGY | Facility: CLINIC | Age: 68
End: 2018-08-24

## 2018-08-24 DIAGNOSIS — I23.6 LEFT VENTRICULAR THROMBUS FOLLOWING MI (H): ICD-10-CM

## 2018-08-24 DIAGNOSIS — I47.29 PAROXYSMAL VENTRICULAR TACHYCARDIA (H): ICD-10-CM

## 2018-08-24 LAB — INTERNATIONAL NORMALIZATION RATIO, POC - HISTORICAL: 2.2

## 2018-09-20 ENCOUNTER — AMBULATORY - HEALTHEAST (OUTPATIENT)
Dept: CARDIOLOGY | Facility: CLINIC | Age: 68
End: 2018-09-20

## 2018-09-20 DIAGNOSIS — Z95.810 ICD (IMPLANTABLE CARDIOVERTER-DEFIBRILLATOR), DUAL, IN SITU: ICD-10-CM

## 2018-09-21 ENCOUNTER — AMBULATORY - HEALTHEAST (OUTPATIENT)
Dept: CARDIOLOGY | Facility: CLINIC | Age: 68
End: 2018-09-21

## 2018-09-21 DIAGNOSIS — I23.6 LEFT VENTRICULAR THROMBUS FOLLOWING MI (H): ICD-10-CM

## 2018-09-21 DIAGNOSIS — I47.29 PAROXYSMAL VENTRICULAR TACHYCARDIA (H): ICD-10-CM

## 2018-09-21 LAB — INTERNATIONAL NORMALIZATION RATIO, POC - HISTORICAL: 1.5

## 2018-10-12 ENCOUNTER — AMBULATORY - HEALTHEAST (OUTPATIENT)
Dept: CARDIOLOGY | Facility: CLINIC | Age: 68
End: 2018-10-12

## 2018-10-12 DIAGNOSIS — I47.29 PAROXYSMAL VENTRICULAR TACHYCARDIA (H): ICD-10-CM

## 2018-10-12 DIAGNOSIS — I23.6 LEFT VENTRICULAR THROMBUS FOLLOWING MI (H): ICD-10-CM

## 2018-10-12 LAB — INTERNATIONAL NORMALIZATION RATIO, POC - HISTORICAL: 2.1

## 2018-11-09 ENCOUNTER — AMBULATORY - HEALTHEAST (OUTPATIENT)
Dept: CARDIOLOGY | Facility: CLINIC | Age: 68
End: 2018-11-09

## 2018-11-09 DIAGNOSIS — I23.6 LEFT VENTRICULAR THROMBUS FOLLOWING MI (H): ICD-10-CM

## 2018-11-09 DIAGNOSIS — I47.29 PAROXYSMAL VENTRICULAR TACHYCARDIA (H): ICD-10-CM

## 2018-11-09 LAB — INTERNATIONAL NORMALIZATION RATIO, POC - HISTORICAL: 2.5

## 2018-11-12 ENCOUNTER — COMMUNICATION - HEALTHEAST (OUTPATIENT)
Dept: CARDIOLOGY | Facility: CLINIC | Age: 68
End: 2018-11-12

## 2018-11-12 DIAGNOSIS — I48.0 PAROXYSMAL ATRIAL FIBRILLATION (H): ICD-10-CM

## 2018-11-12 DIAGNOSIS — I25.10 CAD (CORONARY ARTERY DISEASE): ICD-10-CM

## 2018-11-13 ENCOUNTER — AMBULATORY - HEALTHEAST (OUTPATIENT)
Dept: INTERNAL MEDICINE | Facility: CLINIC | Age: 68
End: 2018-11-13

## 2018-11-13 ENCOUNTER — AMBULATORY - HEALTHEAST (OUTPATIENT)
Dept: NURSING | Facility: CLINIC | Age: 68
End: 2018-11-13

## 2018-11-13 ENCOUNTER — COMMUNICATION - HEALTHEAST (OUTPATIENT)
Dept: CARDIOLOGY | Facility: CLINIC | Age: 68
End: 2018-11-13

## 2018-11-13 DIAGNOSIS — Z23 NEED FOR VACCINATION: ICD-10-CM

## 2018-11-13 DIAGNOSIS — Z23 FLU VACCINE NEED: ICD-10-CM

## 2018-11-14 ENCOUNTER — COMMUNICATION - HEALTHEAST (OUTPATIENT)
Dept: CARDIOLOGY | Facility: CLINIC | Age: 68
End: 2018-11-14

## 2018-11-14 DIAGNOSIS — I25.83 CORONARY ARTERY DISEASE DUE TO LIPID RICH PLAQUE: ICD-10-CM

## 2018-11-14 DIAGNOSIS — I25.10 CORONARY ARTERY DISEASE DUE TO LIPID RICH PLAQUE: ICD-10-CM

## 2018-11-14 DIAGNOSIS — I25.5 CARDIOMYOPATHY, ISCHEMIC: ICD-10-CM

## 2018-12-07 ENCOUNTER — AMBULATORY - HEALTHEAST (OUTPATIENT)
Dept: CARDIOLOGY | Facility: CLINIC | Age: 68
End: 2018-12-07

## 2018-12-07 DIAGNOSIS — I47.29 PAROXYSMAL VENTRICULAR TACHYCARDIA (H): ICD-10-CM

## 2018-12-07 DIAGNOSIS — I23.6 LEFT VENTRICULAR THROMBUS FOLLOWING MI (H): ICD-10-CM

## 2018-12-07 LAB — INTERNATIONAL NORMALIZATION RATIO, POC - HISTORICAL: 2.3

## 2018-12-08 ENCOUNTER — COMMUNICATION - HEALTHEAST (OUTPATIENT)
Dept: CARDIOLOGY | Facility: CLINIC | Age: 68
End: 2018-12-08

## 2018-12-10 ENCOUNTER — COMMUNICATION - HEALTHEAST (OUTPATIENT)
Dept: CARDIOLOGY | Facility: CLINIC | Age: 68
End: 2018-12-10

## 2018-12-10 DIAGNOSIS — I25.5 ISCHEMIC CARDIOMYOPATHY: ICD-10-CM

## 2018-12-11 ENCOUNTER — COMMUNICATION - HEALTHEAST (OUTPATIENT)
Dept: CARDIOLOGY | Facility: CLINIC | Age: 68
End: 2018-12-11

## 2018-12-11 DIAGNOSIS — I25.10 CAD (CORONARY ARTERY DISEASE): ICD-10-CM

## 2019-01-01 ENCOUNTER — AMBULATORY - HEALTHEAST (OUTPATIENT)
Dept: CARDIOLOGY | Facility: CLINIC | Age: 69
End: 2019-01-01

## 2019-01-01 DIAGNOSIS — Z95.810 ICD (IMPLANTABLE CARDIOVERTER-DEFIBRILLATOR), DUAL, IN SITU: ICD-10-CM

## 2019-01-08 ENCOUNTER — COMMUNICATION - HEALTHEAST (OUTPATIENT)
Dept: CARDIOLOGY | Facility: CLINIC | Age: 69
End: 2019-01-08

## 2019-01-11 ENCOUNTER — AMBULATORY - HEALTHEAST (OUTPATIENT)
Dept: CARDIOLOGY | Facility: CLINIC | Age: 69
End: 2019-01-11

## 2019-01-11 DIAGNOSIS — I23.6 LEFT VENTRICULAR THROMBUS FOLLOWING MI (H): ICD-10-CM

## 2019-01-11 DIAGNOSIS — I47.29 PAROXYSMAL VENTRICULAR TACHYCARDIA (H): ICD-10-CM

## 2019-01-11 LAB — INTERNATIONAL NORMALIZATION RATIO, POC - HISTORICAL: 2.3

## 2019-01-12 ENCOUNTER — RECORDS - HEALTHEAST (OUTPATIENT)
Dept: ADMINISTRATIVE | Facility: OTHER | Age: 69
End: 2019-01-12

## 2019-02-08 ENCOUNTER — COMMUNICATION - HEALTHEAST (OUTPATIENT)
Dept: CARDIOLOGY | Facility: CLINIC | Age: 69
End: 2019-02-08

## 2019-02-08 DIAGNOSIS — I25.10 CAD (CORONARY ARTERY DISEASE): ICD-10-CM

## 2019-02-11 ENCOUNTER — AMBULATORY - HEALTHEAST (OUTPATIENT)
Dept: CARDIOLOGY | Facility: CLINIC | Age: 69
End: 2019-02-11

## 2019-02-11 DIAGNOSIS — I23.6 LEFT VENTRICULAR THROMBUS FOLLOWING MI (H): ICD-10-CM

## 2019-02-11 DIAGNOSIS — I47.29 PAROXYSMAL VENTRICULAR TACHYCARDIA (H): ICD-10-CM

## 2019-02-11 LAB — INTERNATIONAL NORMALIZATION RATIO, POC - HISTORICAL: 2.6

## 2019-03-01 ENCOUNTER — COMMUNICATION - HEALTHEAST (OUTPATIENT)
Dept: CARDIOLOGY | Facility: CLINIC | Age: 69
End: 2019-03-01

## 2019-03-01 DIAGNOSIS — I48.0 PAROXYSMAL ATRIAL FIBRILLATION (H): ICD-10-CM

## 2019-03-05 ENCOUNTER — COMMUNICATION - HEALTHEAST (OUTPATIENT)
Dept: CARDIOLOGY | Facility: CLINIC | Age: 69
End: 2019-03-05

## 2019-03-05 DIAGNOSIS — I48.0 PAROXYSMAL ATRIAL FIBRILLATION (H): ICD-10-CM

## 2019-03-08 ENCOUNTER — AMBULATORY - HEALTHEAST (OUTPATIENT)
Dept: CARDIOLOGY | Facility: CLINIC | Age: 69
End: 2019-03-08

## 2019-03-08 DIAGNOSIS — I47.29 PAROXYSMAL VENTRICULAR TACHYCARDIA (H): ICD-10-CM

## 2019-03-08 DIAGNOSIS — I23.6 LEFT VENTRICULAR THROMBUS FOLLOWING MI (H): ICD-10-CM

## 2019-03-08 LAB — INTERNATIONAL NORMALIZATION RATIO, POC - HISTORICAL: 3.2

## 2019-03-22 ENCOUNTER — AMBULATORY - HEALTHEAST (OUTPATIENT)
Dept: CARDIOLOGY | Facility: CLINIC | Age: 69
End: 2019-03-22

## 2019-03-22 DIAGNOSIS — I47.29 PAROXYSMAL VENTRICULAR TACHYCARDIA (H): ICD-10-CM

## 2019-03-22 DIAGNOSIS — I23.6 LEFT VENTRICULAR THROMBUS FOLLOWING MI (H): ICD-10-CM

## 2019-03-22 LAB — INTERNATIONAL NORMALIZATION RATIO, POC - HISTORICAL: 2.5

## 2019-03-27 ENCOUNTER — COMMUNICATION - HEALTHEAST (OUTPATIENT)
Dept: INTERNAL MEDICINE | Facility: CLINIC | Age: 69
End: 2019-03-27

## 2019-03-27 DIAGNOSIS — I25.10 CAD (CORONARY ARTERY DISEASE): ICD-10-CM

## 2019-04-19 ENCOUNTER — AMBULATORY - HEALTHEAST (OUTPATIENT)
Dept: CARDIOLOGY | Facility: CLINIC | Age: 69
End: 2019-04-19

## 2019-04-19 DIAGNOSIS — I47.29 PAROXYSMAL VENTRICULAR TACHYCARDIA (H): ICD-10-CM

## 2019-04-19 DIAGNOSIS — I23.6 LEFT VENTRICULAR THROMBUS FOLLOWING MI (H): ICD-10-CM

## 2019-04-19 LAB — INTERNATIONAL NORMALIZATION RATIO, POC - HISTORICAL: 3

## 2019-04-20 ENCOUNTER — COMMUNICATION - HEALTHEAST (OUTPATIENT)
Dept: CARDIOLOGY | Facility: CLINIC | Age: 69
End: 2019-04-20

## 2019-04-20 ENCOUNTER — COMMUNICATION - HEALTHEAST (OUTPATIENT)
Dept: INTERNAL MEDICINE | Facility: CLINIC | Age: 69
End: 2019-04-20

## 2019-04-20 DIAGNOSIS — I25.10 CAD (CORONARY ARTERY DISEASE): ICD-10-CM

## 2019-04-20 DIAGNOSIS — I48.0 PAROXYSMAL ATRIAL FIBRILLATION (H): ICD-10-CM

## 2019-05-03 ENCOUNTER — AMBULATORY - HEALTHEAST (OUTPATIENT)
Dept: CARDIOLOGY | Facility: CLINIC | Age: 69
End: 2019-05-03

## 2019-05-03 DIAGNOSIS — I47.29 PAROXYSMAL VENTRICULAR TACHYCARDIA (H): ICD-10-CM

## 2019-05-03 DIAGNOSIS — I23.6 LEFT VENTRICULAR THROMBUS FOLLOWING MI (H): ICD-10-CM

## 2019-05-03 DIAGNOSIS — I25.10 CAD (CORONARY ARTERY DISEASE): ICD-10-CM

## 2019-05-03 LAB — INTERNATIONAL NORMALIZATION RATIO, POC - HISTORICAL: 2.3

## 2019-05-09 ENCOUNTER — COMMUNICATION - HEALTHEAST (OUTPATIENT)
Dept: CARDIOLOGY | Facility: CLINIC | Age: 69
End: 2019-05-09

## 2019-05-09 ENCOUNTER — COMMUNICATION - HEALTHEAST (OUTPATIENT)
Dept: ADMINISTRATIVE | Facility: CLINIC | Age: 69
End: 2019-05-09

## 2019-05-09 DIAGNOSIS — I25.10 CAD (CORONARY ARTERY DISEASE): ICD-10-CM

## 2019-05-24 ENCOUNTER — COMMUNICATION - HEALTHEAST (OUTPATIENT)
Dept: ANTICOAGULATION | Facility: CLINIC | Age: 69
End: 2019-05-24

## 2019-05-24 DIAGNOSIS — I47.29 PAROXYSMAL VENTRICULAR TACHYCARDIA (H): ICD-10-CM

## 2019-05-24 DIAGNOSIS — I23.6 LEFT VENTRICULAR THROMBUS FOLLOWING MI (H): ICD-10-CM

## 2019-05-29 ENCOUNTER — COMMUNICATION - HEALTHEAST (OUTPATIENT)
Dept: ANTICOAGULATION | Facility: CLINIC | Age: 69
End: 2019-05-29

## 2019-05-29 DIAGNOSIS — I23.6 LEFT VENTRICULAR THROMBUS FOLLOWING MI (H): ICD-10-CM

## 2019-05-29 DIAGNOSIS — I47.29 PAROXYSMAL VENTRICULAR TACHYCARDIA (H): ICD-10-CM

## 2019-06-04 ENCOUNTER — COMMUNICATION - HEALTHEAST (OUTPATIENT)
Dept: ANTICOAGULATION | Facility: CLINIC | Age: 69
End: 2019-06-04

## 2019-06-04 ENCOUNTER — OFFICE VISIT - HEALTHEAST (OUTPATIENT)
Dept: INTERNAL MEDICINE | Facility: CLINIC | Age: 69
End: 2019-06-04

## 2019-06-04 DIAGNOSIS — Z12.11 SCREEN FOR COLON CANCER: ICD-10-CM

## 2019-06-04 DIAGNOSIS — I23.6 LEFT VENTRICULAR THROMBUS FOLLOWING MI (H): ICD-10-CM

## 2019-06-04 DIAGNOSIS — I47.29 PAROXYSMAL VENTRICULAR TACHYCARDIA (H): ICD-10-CM

## 2019-06-04 DIAGNOSIS — L82.0 INFLAMED SEBORRHEIC KERATOSIS: ICD-10-CM

## 2019-06-04 LAB
CHOLEST SERPL-MCNC: 115 MG/DL
FASTING STATUS PATIENT QL REPORTED: YES
HDLC SERPL-MCNC: 28 MG/DL
INR PPP: 3.3 (ref 0.9–1.1)
LDLC SERPL CALC-MCNC: 43 MG/DL
TRIGL SERPL-MCNC: 219 MG/DL

## 2019-06-04 ASSESSMENT — MIFFLIN-ST. JEOR: SCORE: 1796.84

## 2019-06-12 ENCOUNTER — AMBULATORY - HEALTHEAST (OUTPATIENT)
Dept: CARDIOLOGY | Facility: CLINIC | Age: 69
End: 2019-06-12

## 2019-06-12 ENCOUNTER — COMMUNICATION - HEALTHEAST (OUTPATIENT)
Dept: CARDIOLOGY | Facility: CLINIC | Age: 69
End: 2019-06-12

## 2019-06-12 DIAGNOSIS — I23.6 LEFT VENTRICULAR THROMBUS FOLLOWING MI (H): ICD-10-CM

## 2019-06-12 DIAGNOSIS — Z95.810 ICD (IMPLANTABLE CARDIOVERTER-DEFIBRILLATOR), DUAL, IN SITU: ICD-10-CM

## 2019-06-12 DIAGNOSIS — I47.29 PAROXYSMAL VENTRICULAR TACHYCARDIA (H): ICD-10-CM

## 2019-06-12 ASSESSMENT — MIFFLIN-ST. JEOR: SCORE: 1775.98

## 2019-06-18 ENCOUNTER — COMMUNICATION - HEALTHEAST (OUTPATIENT)
Dept: ANTICOAGULATION | Facility: CLINIC | Age: 69
End: 2019-06-18

## 2019-06-18 ENCOUNTER — AMBULATORY - HEALTHEAST (OUTPATIENT)
Dept: LAB | Facility: CLINIC | Age: 69
End: 2019-06-18

## 2019-06-18 DIAGNOSIS — I23.6 LEFT VENTRICULAR THROMBUS FOLLOWING MI (H): ICD-10-CM

## 2019-06-18 DIAGNOSIS — I47.29 PAROXYSMAL VENTRICULAR TACHYCARDIA (H): ICD-10-CM

## 2019-06-18 LAB — INR PPP: 2.3 (ref 0.9–1.1)

## 2019-06-19 ENCOUNTER — COMMUNICATION - HEALTHEAST (OUTPATIENT)
Dept: INTERNAL MEDICINE | Facility: CLINIC | Age: 69
End: 2019-06-19

## 2019-06-19 DIAGNOSIS — I25.10 CAD (CORONARY ARTERY DISEASE): ICD-10-CM

## 2019-06-25 ENCOUNTER — RECORDS - HEALTHEAST (OUTPATIENT)
Dept: ADMINISTRATIVE | Facility: OTHER | Age: 69
End: 2019-06-25

## 2019-07-09 ENCOUNTER — OFFICE VISIT - HEALTHEAST (OUTPATIENT)
Dept: INTERNAL MEDICINE | Facility: CLINIC | Age: 69
End: 2019-07-09

## 2019-07-09 ENCOUNTER — COMMUNICATION - HEALTHEAST (OUTPATIENT)
Dept: ANTICOAGULATION | Facility: CLINIC | Age: 69
End: 2019-07-09

## 2019-07-09 ENCOUNTER — AMBULATORY - HEALTHEAST (OUTPATIENT)
Dept: INTERNAL MEDICINE | Facility: CLINIC | Age: 69
End: 2019-07-09

## 2019-07-09 ENCOUNTER — HOSPITAL ENCOUNTER (OUTPATIENT)
Dept: CT IMAGING | Facility: CLINIC | Age: 69
Discharge: HOME | End: 2019-07-09
Attending: INTERNAL MEDICINE

## 2019-07-09 DIAGNOSIS — K57.92 ACUTE DIVERTICULITIS: ICD-10-CM

## 2019-07-09 ASSESSMENT — MIFFLIN-ST. JEOR: SCORE: 1783.23

## 2019-07-10 ENCOUNTER — COMMUNICATION - HEALTHEAST (OUTPATIENT)
Dept: ANTICOAGULATION | Facility: CLINIC | Age: 69
End: 2019-07-10

## 2019-07-10 DIAGNOSIS — I47.29 PAROXYSMAL VENTRICULAR TACHYCARDIA (H): ICD-10-CM

## 2019-07-10 DIAGNOSIS — I23.6 LEFT VENTRICULAR THROMBUS FOLLOWING MI (H): ICD-10-CM

## 2019-07-12 ENCOUNTER — COMMUNICATION - HEALTHEAST (OUTPATIENT)
Dept: ANTICOAGULATION | Facility: CLINIC | Age: 69
End: 2019-07-12

## 2019-07-12 ENCOUNTER — AMBULATORY - HEALTHEAST (OUTPATIENT)
Dept: LAB | Facility: CLINIC | Age: 69
End: 2019-07-12

## 2019-07-12 ENCOUNTER — COMMUNICATION - HEALTHEAST (OUTPATIENT)
Dept: PHARMACY | Facility: CLINIC | Age: 69
End: 2019-07-12

## 2019-07-12 DIAGNOSIS — I23.6 LEFT VENTRICULAR THROMBUS FOLLOWING MI (H): ICD-10-CM

## 2019-07-12 DIAGNOSIS — I47.29 PAROXYSMAL VENTRICULAR TACHYCARDIA (H): ICD-10-CM

## 2019-07-12 DIAGNOSIS — I25.5 ISCHEMIC CARDIOMYOPATHY: ICD-10-CM

## 2019-07-12 LAB — INR PPP: 1.6 (ref 0.9–1.1)

## 2019-07-17 ENCOUNTER — COMMUNICATION - HEALTHEAST (OUTPATIENT)
Dept: ANTICOAGULATION | Facility: CLINIC | Age: 69
End: 2019-07-17

## 2019-07-17 ENCOUNTER — AMBULATORY - HEALTHEAST (OUTPATIENT)
Dept: LAB | Facility: CLINIC | Age: 69
End: 2019-07-17

## 2019-07-17 DIAGNOSIS — I47.29 PAROXYSMAL VENTRICULAR TACHYCARDIA (H): ICD-10-CM

## 2019-07-17 DIAGNOSIS — I23.6 LEFT VENTRICULAR THROMBUS FOLLOWING MI (H): ICD-10-CM

## 2019-07-17 LAB — INR PPP: 4.3 (ref 0.9–1.1)

## 2019-07-18 ENCOUNTER — COMMUNICATION - HEALTHEAST (OUTPATIENT)
Dept: CARDIOLOGY | Facility: CLINIC | Age: 69
End: 2019-07-18

## 2019-07-18 ENCOUNTER — COMMUNICATION - HEALTHEAST (OUTPATIENT)
Dept: INTERNAL MEDICINE | Facility: CLINIC | Age: 69
End: 2019-07-18

## 2019-07-18 DIAGNOSIS — I25.10 CAD (CORONARY ARTERY DISEASE): ICD-10-CM

## 2019-07-19 ENCOUNTER — COMMUNICATION - HEALTHEAST (OUTPATIENT)
Dept: ANTICOAGULATION | Facility: CLINIC | Age: 69
End: 2019-07-19

## 2019-07-19 ENCOUNTER — AMBULATORY - HEALTHEAST (OUTPATIENT)
Dept: LAB | Facility: CLINIC | Age: 69
End: 2019-07-19

## 2019-07-19 DIAGNOSIS — I23.6 LEFT VENTRICULAR THROMBUS FOLLOWING MI (H): ICD-10-CM

## 2019-07-19 DIAGNOSIS — I47.29 PAROXYSMAL VENTRICULAR TACHYCARDIA (H): ICD-10-CM

## 2019-07-19 LAB — INR PPP: 3.2 (ref 0.9–1.1)

## 2019-07-23 ENCOUNTER — COMMUNICATION - HEALTHEAST (OUTPATIENT)
Dept: ANTICOAGULATION | Facility: CLINIC | Age: 69
End: 2019-07-23

## 2019-07-23 ENCOUNTER — AMBULATORY - HEALTHEAST (OUTPATIENT)
Dept: LAB | Facility: CLINIC | Age: 69
End: 2019-07-23

## 2019-07-23 DIAGNOSIS — I47.29 PAROXYSMAL VENTRICULAR TACHYCARDIA (H): ICD-10-CM

## 2019-07-23 DIAGNOSIS — I23.6 LEFT VENTRICULAR THROMBUS FOLLOWING MI (H): ICD-10-CM

## 2019-07-23 LAB — INR PPP: 3.4 (ref 0.9–1.1)

## 2019-07-26 ENCOUNTER — AMBULATORY - HEALTHEAST (OUTPATIENT)
Dept: LAB | Facility: CLINIC | Age: 69
End: 2019-07-26

## 2019-07-26 ENCOUNTER — COMMUNICATION - HEALTHEAST (OUTPATIENT)
Dept: ANTICOAGULATION | Facility: CLINIC | Age: 69
End: 2019-07-26

## 2019-07-26 DIAGNOSIS — I23.6 LEFT VENTRICULAR THROMBUS FOLLOWING MI (H): ICD-10-CM

## 2019-07-26 DIAGNOSIS — I47.29 PAROXYSMAL VENTRICULAR TACHYCARDIA (H): ICD-10-CM

## 2019-07-26 LAB — INR PPP: 1.5 (ref 0.9–1.1)

## 2019-08-02 ENCOUNTER — AMBULATORY - HEALTHEAST (OUTPATIENT)
Dept: LAB | Facility: CLINIC | Age: 69
End: 2019-08-02

## 2019-08-02 ENCOUNTER — OFFICE VISIT - HEALTHEAST (OUTPATIENT)
Dept: CARDIOLOGY | Facility: CLINIC | Age: 69
End: 2019-08-02

## 2019-08-02 ENCOUNTER — COMMUNICATION - HEALTHEAST (OUTPATIENT)
Dept: ANTICOAGULATION | Facility: CLINIC | Age: 69
End: 2019-08-02

## 2019-08-02 DIAGNOSIS — I47.29 PAROXYSMAL VENTRICULAR TACHYCARDIA (H): ICD-10-CM

## 2019-08-02 DIAGNOSIS — Z95.810 ICD (IMPLANTABLE CARDIOVERTER-DEFIBRILLATOR), DUAL, IN SITU: ICD-10-CM

## 2019-08-02 DIAGNOSIS — I23.6 LEFT VENTRICULAR THROMBUS FOLLOWING MI (H): ICD-10-CM

## 2019-08-02 DIAGNOSIS — I50.22 CHRONIC SYSTOLIC CHF (CONGESTIVE HEART FAILURE), NYHA CLASS 2 (H): ICD-10-CM

## 2019-08-02 DIAGNOSIS — I25.5 ISCHEMIC CARDIOMYOPATHY: ICD-10-CM

## 2019-08-02 DIAGNOSIS — I21.3 ST ELEVATION MI (STEMI) (H): ICD-10-CM

## 2019-08-02 LAB — INR PPP: 2.1 (ref 0.9–1.1)

## 2019-08-02 ASSESSMENT — MIFFLIN-ST. JEOR: SCORE: 1787.77

## 2019-08-16 ENCOUNTER — AMBULATORY - HEALTHEAST (OUTPATIENT)
Dept: LAB | Facility: CLINIC | Age: 69
End: 2019-08-16

## 2019-08-16 ENCOUNTER — COMMUNICATION - HEALTHEAST (OUTPATIENT)
Dept: ANTICOAGULATION | Facility: CLINIC | Age: 69
End: 2019-08-16

## 2019-08-16 DIAGNOSIS — I23.6 LEFT VENTRICULAR THROMBUS FOLLOWING MI (H): ICD-10-CM

## 2019-08-16 DIAGNOSIS — I47.29 PAROXYSMAL VENTRICULAR TACHYCARDIA (H): ICD-10-CM

## 2019-08-16 LAB — INR PPP: 2.1 (ref 0.9–1.1)

## 2019-09-06 ENCOUNTER — COMMUNICATION - HEALTHEAST (OUTPATIENT)
Dept: ANTICOAGULATION | Facility: CLINIC | Age: 69
End: 2019-09-06

## 2019-09-06 ENCOUNTER — AMBULATORY - HEALTHEAST (OUTPATIENT)
Dept: LAB | Facility: CLINIC | Age: 69
End: 2019-09-06

## 2019-09-06 DIAGNOSIS — I23.6 LEFT VENTRICULAR THROMBUS FOLLOWING MI (H): ICD-10-CM

## 2019-09-06 DIAGNOSIS — I47.29 PAROXYSMAL VENTRICULAR TACHYCARDIA (H): ICD-10-CM

## 2019-09-06 LAB — INR PPP: 2.7 (ref 0.9–1.1)

## 2019-09-10 ENCOUNTER — AMBULATORY - HEALTHEAST (OUTPATIENT)
Dept: CARDIOLOGY | Facility: CLINIC | Age: 69
End: 2019-09-10

## 2019-09-10 DIAGNOSIS — Z95.810 ICD (IMPLANTABLE CARDIOVERTER-DEFIBRILLATOR), DUAL, IN SITU: ICD-10-CM

## 2019-10-04 ENCOUNTER — COMMUNICATION - HEALTHEAST (OUTPATIENT)
Dept: ANTICOAGULATION | Facility: CLINIC | Age: 69
End: 2019-10-04

## 2019-10-04 ENCOUNTER — AMBULATORY - HEALTHEAST (OUTPATIENT)
Dept: LAB | Facility: CLINIC | Age: 69
End: 2019-10-04

## 2019-10-04 DIAGNOSIS — I47.29 PAROXYSMAL VENTRICULAR TACHYCARDIA (H): ICD-10-CM

## 2019-10-04 DIAGNOSIS — I23.6 LEFT VENTRICULAR THROMBUS FOLLOWING MI (H): ICD-10-CM

## 2019-10-04 LAB — INR PPP: 2.7 (ref 0.9–1.1)

## 2019-11-07 ENCOUNTER — COMMUNICATION - HEALTHEAST (OUTPATIENT)
Dept: PHARMACY | Facility: CLINIC | Age: 69
End: 2019-11-07

## 2019-11-07 DIAGNOSIS — I48.0 PAROXYSMAL ATRIAL FIBRILLATION (H): ICD-10-CM

## 2019-11-07 DIAGNOSIS — I25.10 CAD (CORONARY ARTERY DISEASE): ICD-10-CM

## 2019-11-15 ENCOUNTER — AMBULATORY - HEALTHEAST (OUTPATIENT)
Dept: LAB | Facility: CLINIC | Age: 69
End: 2019-11-15

## 2019-11-15 ENCOUNTER — COMMUNICATION - HEALTHEAST (OUTPATIENT)
Dept: ANTICOAGULATION | Facility: CLINIC | Age: 69
End: 2019-11-15

## 2019-11-15 DIAGNOSIS — I23.6 LEFT VENTRICULAR THROMBUS FOLLOWING MI (H): ICD-10-CM

## 2019-11-15 DIAGNOSIS — I47.29 PAROXYSMAL VENTRICULAR TACHYCARDIA (H): ICD-10-CM

## 2019-11-15 LAB — INR PPP: 3.5 (ref 0.9–1.1)

## 2019-12-03 ENCOUNTER — COMMUNICATION - HEALTHEAST (OUTPATIENT)
Dept: ANTICOAGULATION | Facility: CLINIC | Age: 69
End: 2019-12-03

## 2019-12-03 DIAGNOSIS — I47.29 PAROXYSMAL VENTRICULAR TACHYCARDIA (H): ICD-10-CM

## 2019-12-03 DIAGNOSIS — I23.6 LEFT VENTRICULAR THROMBUS FOLLOWING MI (H): ICD-10-CM

## 2019-12-11 ENCOUNTER — AMBULATORY - HEALTHEAST (OUTPATIENT)
Dept: CARDIOLOGY | Facility: CLINIC | Age: 69
End: 2019-12-11

## 2019-12-11 DIAGNOSIS — I25.5 ISCHEMIC CARDIOMYOPATHY: ICD-10-CM

## 2019-12-11 DIAGNOSIS — Z95.810 ICD (IMPLANTABLE CARDIOVERTER-DEFIBRILLATOR), DUAL, IN SITU: ICD-10-CM

## 2019-12-26 ENCOUNTER — COMMUNICATION - HEALTHEAST (OUTPATIENT)
Dept: ANTICOAGULATION | Facility: CLINIC | Age: 69
End: 2019-12-26

## 2020-01-21 ENCOUNTER — AMBULATORY - HEALTHEAST (OUTPATIENT)
Dept: LAB | Facility: CLINIC | Age: 70
End: 2020-01-21

## 2020-01-21 ENCOUNTER — COMMUNICATION - HEALTHEAST (OUTPATIENT)
Dept: ANTICOAGULATION | Facility: CLINIC | Age: 70
End: 2020-01-21

## 2020-01-21 DIAGNOSIS — I23.6 LEFT VENTRICULAR THROMBUS FOLLOWING MI (H): ICD-10-CM

## 2020-01-21 DIAGNOSIS — I47.29 PAROXYSMAL VENTRICULAR TACHYCARDIA (H): ICD-10-CM

## 2020-01-21 LAB — INR PPP: 3 (ref 0.9–1.1)

## 2020-02-15 ENCOUNTER — COMMUNICATION - HEALTHEAST (OUTPATIENT)
Dept: CARDIOLOGY | Facility: CLINIC | Age: 70
End: 2020-02-15

## 2020-02-15 DIAGNOSIS — I25.10 CAD (CORONARY ARTERY DISEASE): ICD-10-CM

## 2020-02-18 ENCOUNTER — COMMUNICATION - HEALTHEAST (OUTPATIENT)
Dept: CARDIOLOGY | Facility: CLINIC | Age: 70
End: 2020-02-18

## 2020-02-19 ENCOUNTER — COMMUNICATION - HEALTHEAST (OUTPATIENT)
Dept: CARDIOLOGY | Facility: CLINIC | Age: 70
End: 2020-02-19

## 2020-02-19 DIAGNOSIS — I25.5 ISCHEMIC CARDIOMYOPATHY: ICD-10-CM

## 2020-02-21 ENCOUNTER — COMMUNICATION - HEALTHEAST (OUTPATIENT)
Dept: ANTICOAGULATION | Facility: CLINIC | Age: 70
End: 2020-02-21

## 2020-02-21 ENCOUNTER — AMBULATORY - HEALTHEAST (OUTPATIENT)
Dept: LAB | Facility: CLINIC | Age: 70
End: 2020-02-21

## 2020-02-21 DIAGNOSIS — I47.29 PAROXYSMAL VENTRICULAR TACHYCARDIA (H): ICD-10-CM

## 2020-02-21 DIAGNOSIS — I23.6 LEFT VENTRICULAR THROMBUS FOLLOWING MI (H): ICD-10-CM

## 2020-02-21 LAB — INR PPP: 3.1 (ref 0.9–1.1)

## 2020-03-06 ENCOUNTER — OFFICE VISIT - HEALTHEAST (OUTPATIENT)
Dept: INTERNAL MEDICINE | Facility: CLINIC | Age: 70
End: 2020-03-06

## 2020-03-06 ENCOUNTER — COMMUNICATION - HEALTHEAST (OUTPATIENT)
Dept: ANTICOAGULATION | Facility: CLINIC | Age: 70
End: 2020-03-06

## 2020-03-06 DIAGNOSIS — I25.83 CORONARY ARTERY DISEASE DUE TO LIPID RICH PLAQUE: ICD-10-CM

## 2020-03-06 DIAGNOSIS — I25.5 CARDIOMYOPATHY, ISCHEMIC: ICD-10-CM

## 2020-03-06 DIAGNOSIS — Z12.11 SCREENING FOR COLON CANCER: ICD-10-CM

## 2020-03-06 DIAGNOSIS — I25.10 CORONARY ARTERY DISEASE DUE TO LIPID RICH PLAQUE: ICD-10-CM

## 2020-03-06 DIAGNOSIS — R42 LIGHTHEADEDNESS: ICD-10-CM

## 2020-03-06 DIAGNOSIS — I23.6 LEFT VENTRICULAR THROMBUS FOLLOWING MI (H): ICD-10-CM

## 2020-03-06 DIAGNOSIS — Z12.5 SCREENING FOR PROSTATE CANCER: ICD-10-CM

## 2020-03-06 DIAGNOSIS — Z11.59 NEED FOR HEPATITIS C SCREENING TEST: ICD-10-CM

## 2020-03-06 DIAGNOSIS — I47.29 PAROXYSMAL VENTRICULAR TACHYCARDIA (H): ICD-10-CM

## 2020-03-06 LAB
ALBUMIN SERPL-MCNC: 4 G/DL (ref 3.5–5)
ALP SERPL-CCNC: 61 U/L (ref 45–120)
ALT SERPL W P-5'-P-CCNC: 36 U/L (ref 0–45)
ANION GAP SERPL CALCULATED.3IONS-SCNC: 10 MMOL/L (ref 5–18)
AST SERPL W P-5'-P-CCNC: 19 U/L (ref 0–40)
ATRIAL RATE - MUSE: 59 BPM
BILIRUB SERPL-MCNC: 0.7 MG/DL (ref 0–1)
BUN SERPL-MCNC: 17 MG/DL (ref 8–22)
CALCIUM SERPL-MCNC: 9.3 MG/DL (ref 8.5–10.5)
CHLORIDE BLD-SCNC: 104 MMOL/L (ref 98–107)
CO2 SERPL-SCNC: 26 MMOL/L (ref 22–31)
CREAT SERPL-MCNC: 1.06 MG/DL (ref 0.7–1.3)
DIASTOLIC BLOOD PRESSURE - MUSE: NORMAL
ERYTHROCYTE [DISTWIDTH] IN BLOOD BY AUTOMATED COUNT: 12.2 % (ref 11–14.5)
GFR SERPL CREATININE-BSD FRML MDRD: >60 ML/MIN/1.73M2
GLUCOSE BLD-MCNC: 78 MG/DL (ref 70–125)
HCT VFR BLD AUTO: 50.3 % (ref 40–54)
HGB BLD-MCNC: 16.6 G/DL (ref 14–18)
INR PPP: 3.1 (ref 0.9–1.1)
INTERPRETATION ECG - MUSE: NORMAL
MCH RBC QN AUTO: 28.9 PG (ref 27–34)
MCHC RBC AUTO-ENTMCNC: 33.1 G/DL (ref 32–36)
MCV RBC AUTO: 87 FL (ref 80–100)
P AXIS - MUSE: -6 DEGREES
PLATELET # BLD AUTO: 192 THOU/UL (ref 140–440)
PMV BLD AUTO: 7.7 FL (ref 7–10)
POTASSIUM BLD-SCNC: 4.5 MMOL/L (ref 3.5–5)
PR INTERVAL - MUSE: 136 MS
PROT SERPL-MCNC: 6.8 G/DL (ref 6–8)
PSA SERPL-MCNC: 1.3 NG/ML (ref 0–4.5)
QRS DURATION - MUSE: 94 MS
QT - MUSE: 404 MS
QTC - MUSE: 399 MS
R AXIS - MUSE: 70 DEGREES
RBC # BLD AUTO: 5.75 MILL/UL (ref 4.4–6.2)
SODIUM SERPL-SCNC: 140 MMOL/L (ref 136–145)
SYSTOLIC BLOOD PRESSURE - MUSE: NORMAL
T AXIS - MUSE: 58 DEGREES
TSH SERPL DL<=0.005 MIU/L-ACNC: 1.45 UIU/ML (ref 0.3–5)
VENTRICULAR RATE- MUSE: 59 BPM
VIT B12 SERPL-MCNC: 308 PG/ML (ref 213–816)
WBC: 6.5 THOU/UL (ref 4–11)

## 2020-03-06 ASSESSMENT — MIFFLIN-ST. JEOR: SCORE: 1787.77

## 2020-03-09 ENCOUNTER — COMMUNICATION - HEALTHEAST (OUTPATIENT)
Dept: ANTICOAGULATION | Facility: CLINIC | Age: 70
End: 2020-03-09

## 2020-03-09 ENCOUNTER — OFFICE VISIT - HEALTHEAST (OUTPATIENT)
Dept: INTERNAL MEDICINE | Facility: CLINIC | Age: 70
End: 2020-03-09

## 2020-03-09 DIAGNOSIS — I47.29 PAROXYSMAL VENTRICULAR TACHYCARDIA (H): ICD-10-CM

## 2020-03-09 DIAGNOSIS — K57.32 DIVERTICULITIS OF COLON: ICD-10-CM

## 2020-03-09 DIAGNOSIS — I23.6 LEFT VENTRICULAR THROMBUS FOLLOWING MI (H): ICD-10-CM

## 2020-03-09 LAB — HCV AB SERPL QL IA: NEGATIVE

## 2020-03-09 ASSESSMENT — MIFFLIN-ST. JEOR: SCORE: 1783.23

## 2020-03-11 ENCOUNTER — AMBULATORY - HEALTHEAST (OUTPATIENT)
Dept: CARDIOLOGY | Facility: CLINIC | Age: 70
End: 2020-03-11

## 2020-03-11 DIAGNOSIS — I25.5 ISCHEMIC CARDIOMYOPATHY: ICD-10-CM

## 2020-03-11 DIAGNOSIS — Z95.810 ICD (IMPLANTABLE CARDIOVERTER-DEFIBRILLATOR), DUAL, IN SITU: ICD-10-CM

## 2020-03-16 ENCOUNTER — COMMUNICATION - HEALTHEAST (OUTPATIENT)
Dept: INTERNAL MEDICINE | Facility: CLINIC | Age: 70
End: 2020-03-16

## 2020-03-18 ENCOUNTER — COMMUNICATION - HEALTHEAST (OUTPATIENT)
Dept: ANTICOAGULATION | Facility: CLINIC | Age: 70
End: 2020-03-18

## 2020-03-18 ENCOUNTER — OFFICE VISIT - HEALTHEAST (OUTPATIENT)
Dept: INTERNAL MEDICINE | Facility: CLINIC | Age: 70
End: 2020-03-18

## 2020-03-18 DIAGNOSIS — K57.32 DIVERTICULITIS OF COLON: ICD-10-CM

## 2020-03-18 DIAGNOSIS — I47.29 PAROXYSMAL VENTRICULAR TACHYCARDIA (H): ICD-10-CM

## 2020-03-18 DIAGNOSIS — I23.6 LEFT VENTRICULAR THROMBUS FOLLOWING MI (H): ICD-10-CM

## 2020-03-18 LAB — INR PPP: 4.2 (ref 0.9–1.1)

## 2020-03-18 ASSESSMENT — MIFFLIN-ST. JEOR: SCORE: 1769.63

## 2020-03-31 ENCOUNTER — COMMUNICATION - HEALTHEAST (OUTPATIENT)
Dept: ANTICOAGULATION | Facility: CLINIC | Age: 70
End: 2020-03-31

## 2020-03-31 ENCOUNTER — AMBULATORY - HEALTHEAST (OUTPATIENT)
Dept: LAB | Facility: CLINIC | Age: 70
End: 2020-03-31

## 2020-03-31 DIAGNOSIS — I47.29 PAROXYSMAL VENTRICULAR TACHYCARDIA (H): ICD-10-CM

## 2020-03-31 DIAGNOSIS — I23.6 LEFT VENTRICULAR THROMBUS FOLLOWING MI (H): ICD-10-CM

## 2020-03-31 LAB — INR PPP: 2 (ref 0.9–1.1)

## 2020-04-14 ENCOUNTER — COMMUNICATION - HEALTHEAST (OUTPATIENT)
Dept: ANTICOAGULATION | Facility: CLINIC | Age: 70
End: 2020-04-14

## 2020-04-14 ENCOUNTER — AMBULATORY - HEALTHEAST (OUTPATIENT)
Dept: LAB | Facility: CLINIC | Age: 70
End: 2020-04-14

## 2020-04-14 DIAGNOSIS — I23.6 LEFT VENTRICULAR THROMBUS FOLLOWING MI (H): ICD-10-CM

## 2020-04-14 DIAGNOSIS — I47.29 PAROXYSMAL VENTRICULAR TACHYCARDIA (H): ICD-10-CM

## 2020-04-14 LAB — INR PPP: 1.6 (ref 0.9–1.1)

## 2020-04-29 ENCOUNTER — COMMUNICATION - HEALTHEAST (OUTPATIENT)
Dept: ANTICOAGULATION | Facility: CLINIC | Age: 70
End: 2020-04-29

## 2020-05-01 ENCOUNTER — COMMUNICATION - HEALTHEAST (OUTPATIENT)
Dept: ANTICOAGULATION | Facility: CLINIC | Age: 70
End: 2020-05-01

## 2020-05-01 ENCOUNTER — AMBULATORY - HEALTHEAST (OUTPATIENT)
Dept: LAB | Facility: CLINIC | Age: 70
End: 2020-05-01

## 2020-05-01 DIAGNOSIS — I47.29 PAROXYSMAL VENTRICULAR TACHYCARDIA (H): ICD-10-CM

## 2020-05-01 DIAGNOSIS — I23.6 LEFT VENTRICULAR THROMBUS FOLLOWING MI (H): ICD-10-CM

## 2020-05-01 LAB — INR PPP: 2 (ref 0.9–1.1)

## 2020-05-08 ENCOUNTER — COMMUNICATION - HEALTHEAST (OUTPATIENT)
Dept: INTERNAL MEDICINE | Facility: CLINIC | Age: 70
End: 2020-05-08

## 2020-05-08 DIAGNOSIS — I25.10 CAD (CORONARY ARTERY DISEASE): ICD-10-CM

## 2020-05-11 ENCOUNTER — COMMUNICATION - HEALTHEAST (OUTPATIENT)
Dept: PHARMACY | Facility: CLINIC | Age: 70
End: 2020-05-11

## 2020-05-11 DIAGNOSIS — I25.5 CARDIOMYOPATHY, ISCHEMIC: ICD-10-CM

## 2020-05-11 DIAGNOSIS — I25.83 CORONARY ARTERY DISEASE DUE TO LIPID RICH PLAQUE: ICD-10-CM

## 2020-05-11 DIAGNOSIS — I25.10 CORONARY ARTERY DISEASE DUE TO LIPID RICH PLAQUE: ICD-10-CM

## 2020-05-11 DIAGNOSIS — I48.0 PAROXYSMAL ATRIAL FIBRILLATION (H): ICD-10-CM

## 2020-05-15 ENCOUNTER — AMBULATORY - HEALTHEAST (OUTPATIENT)
Dept: LAB | Facility: CLINIC | Age: 70
End: 2020-05-15

## 2020-05-15 ENCOUNTER — COMMUNICATION - HEALTHEAST (OUTPATIENT)
Dept: ANTICOAGULATION | Facility: CLINIC | Age: 70
End: 2020-05-15

## 2020-05-15 DIAGNOSIS — I47.29 PAROXYSMAL VENTRICULAR TACHYCARDIA (H): ICD-10-CM

## 2020-05-15 DIAGNOSIS — I23.6 LEFT VENTRICULAR THROMBUS FOLLOWING MI (H): ICD-10-CM

## 2020-05-15 LAB — INR PPP: 2.6 (ref 0.9–1.1)

## 2020-06-12 ENCOUNTER — AMBULATORY - HEALTHEAST (OUTPATIENT)
Dept: LAB | Facility: CLINIC | Age: 70
End: 2020-06-12

## 2020-06-12 ENCOUNTER — COMMUNICATION - HEALTHEAST (OUTPATIENT)
Dept: ANTICOAGULATION | Facility: CLINIC | Age: 70
End: 2020-06-12

## 2020-06-12 DIAGNOSIS — I23.6 LEFT VENTRICULAR THROMBUS FOLLOWING MI (H): ICD-10-CM

## 2020-06-12 DIAGNOSIS — I47.29 PAROXYSMAL VENTRICULAR TACHYCARDIA (H): ICD-10-CM

## 2020-06-12 LAB — INR PPP: 3.1 (ref 0.9–1.1)

## 2020-06-15 ENCOUNTER — AMBULATORY - HEALTHEAST (OUTPATIENT)
Dept: CARDIOLOGY | Facility: CLINIC | Age: 70
End: 2020-06-15

## 2020-06-15 DIAGNOSIS — I25.5 ISCHEMIC CARDIOMYOPATHY: ICD-10-CM

## 2020-06-15 DIAGNOSIS — Z95.810 ICD (IMPLANTABLE CARDIOVERTER-DEFIBRILLATOR), DUAL, IN SITU: ICD-10-CM

## 2020-07-14 ENCOUNTER — AMBULATORY - HEALTHEAST (OUTPATIENT)
Dept: LAB | Facility: CLINIC | Age: 70
End: 2020-07-14

## 2020-07-14 ENCOUNTER — COMMUNICATION - HEALTHEAST (OUTPATIENT)
Dept: ANTICOAGULATION | Facility: CLINIC | Age: 70
End: 2020-07-14

## 2020-07-14 DIAGNOSIS — I23.6 LEFT VENTRICULAR THROMBUS FOLLOWING MI (H): ICD-10-CM

## 2020-07-14 DIAGNOSIS — I47.29 PAROXYSMAL VENTRICULAR TACHYCARDIA (H): ICD-10-CM

## 2020-07-14 LAB — INR PPP: 2.1 (ref 0.9–1.1)

## 2020-08-11 ENCOUNTER — COMMUNICATION - HEALTHEAST (OUTPATIENT)
Dept: ANTICOAGULATION | Facility: CLINIC | Age: 70
End: 2020-08-11

## 2020-08-11 ENCOUNTER — AMBULATORY - HEALTHEAST (OUTPATIENT)
Dept: LAB | Facility: CLINIC | Age: 70
End: 2020-08-11

## 2020-08-11 DIAGNOSIS — I23.6 LEFT VENTRICULAR THROMBUS FOLLOWING MI (H): ICD-10-CM

## 2020-08-11 DIAGNOSIS — I47.29 PAROXYSMAL VENTRICULAR TACHYCARDIA (H): ICD-10-CM

## 2020-08-11 LAB — INR PPP: 1.9 (ref 0.9–1.1)

## 2020-08-17 ENCOUNTER — COMMUNICATION - HEALTHEAST (OUTPATIENT)
Dept: PHARMACY | Facility: CLINIC | Age: 70
End: 2020-08-17

## 2020-08-17 DIAGNOSIS — I48.0 PAROXYSMAL ATRIAL FIBRILLATION (H): ICD-10-CM

## 2020-08-17 DIAGNOSIS — I25.10 CAD (CORONARY ARTERY DISEASE): ICD-10-CM

## 2020-09-03 ENCOUNTER — COMMUNICATION - HEALTHEAST (OUTPATIENT)
Dept: ANTICOAGULATION | Facility: CLINIC | Age: 70
End: 2020-09-03

## 2020-09-03 DIAGNOSIS — I47.29 PAROXYSMAL VENTRICULAR TACHYCARDIA (H): ICD-10-CM

## 2020-09-03 DIAGNOSIS — I23.6 LEFT VENTRICULAR THROMBUS FOLLOWING MI (H): ICD-10-CM

## 2020-11-12 ENCOUNTER — COMMUNICATION - HEALTHEAST (OUTPATIENT)
Dept: INTERNAL MEDICINE | Facility: CLINIC | Age: 70
End: 2020-11-12

## 2020-11-24 ENCOUNTER — AMBULATORY - HEALTHEAST (OUTPATIENT)
Dept: CARDIOLOGY | Facility: CLINIC | Age: 70
End: 2020-11-24

## 2020-11-24 DIAGNOSIS — I25.5 ISCHEMIC CARDIOMYOPATHY: ICD-10-CM

## 2020-11-24 DIAGNOSIS — Z95.810 ICD (IMPLANTABLE CARDIOVERTER-DEFIBRILLATOR), DUAL, IN SITU: ICD-10-CM

## 2021-01-15 ENCOUNTER — HEALTH MAINTENANCE LETTER (OUTPATIENT)
Age: 71
End: 2021-01-15

## 2021-05-26 NOTE — PROGRESS NOTES
INR 2.5 Continue current management dosing of Warfarin. Continue  diet of moderate Vitamin K intake. Discussed with pt the need to call with questions or concerns or any change in medication especially herbal medication or OTC. Call with increased bleeding or bruising or any upcoming procedures.

## 2021-05-28 NOTE — PATIENT INSTRUCTIONS - HE
INR 2.3 Continue current management dosing of Warfarin. Continue  diet of moderate Vitamin K intake. Discussed with pt the need to call with questions or concerns or any change in medication especially herbal medication or OTC. Call with increased bleeding or bruising or any upcoming procedures.

## 2021-05-28 NOTE — PROGRESS NOTES
INR 3.0 will decrease dose to 5 mg Monday and 2.5 mg all other days. Retest in 2 weeks. After talking with pt and discussing history of greens/salads and medication change. Pt will  continue  with current diet and dosing of Warfarin.  Continue with moderation of Vit K and green leafy vegetables. Cautioned to call with increase bruising or bleeding. Reminded to call with medication change especially antibiotic. Call with any questions or concerns or any up coming procedures. Cautioned about using Herbal medication.

## 2021-05-28 NOTE — TELEPHONE ENCOUNTER
Refill Approved    Rx renewed per Medication Renewal Policy. Medication was last renewed on 3/27/19.    Jeanie Cespedes, [facility] [place] Connection Triage/Med Refill 4/23/2019     Requested Prescriptions   Pending Prescriptions Disp Refills     carvedilol (COREG) 6.25 MG tablet 180 tablet 0       Beta-Blockers Refill Protocol Passed - 4/20/2019  8:27 AM        Passed - PCP or prescribing provider visit in past 12 months or next 3 months     Last office visit with prescriber/PCP: Visit date not found OR same dept: 6/26/2018 Ramírez Pedersen MD OR same specialty: 6/26/2018 Ramírez Pedersen MD  Last physical: Visit date not found Last MTM visit: Visit date not found   Next visit within 3 mo: Visit date not found  Next physical within 3 mo: Visit date not found  Prescriber OR PCP: Denny Mejias MD  Last diagnosis associated with med order: 1. CAD (coronary artery disease)  - carvedilol (COREG) 6.25 MG tablet  Dispense: 180 tablet; Refill: 0    If protocol passes may refill for 12 months if within 3 months of last provider visit (or a total of 15 months).             Passed - Blood pressure filed in past 12 months     BP Readings from Last 1 Encounters:   06/26/18 116/70

## 2021-05-29 NOTE — TELEPHONE ENCOUNTER
No response from patient - have been trying to reach him since 6/4/19.     - if he calls back.     - transfer call to Radha

## 2021-05-29 NOTE — PROGRESS NOTES
Office Visit - Follow Up   Claudio Chambers   68 y.o. male    Date of Visit: 6/4/2019    Chief Complaint   Patient presents with     Nevus     Check scab on top of left foot, not sure if infected - check spots on trunk of body        Assessment and Plan   1. Screen for colon cancer    - Ambulatory referral for Colonoscopy    2. Inflamed seborrheic keratosis  Been trying to move this and has been picking at it some.  It looks like an inflamed fairly large patch of seborrheic keratosis on the dorsal surface of his foot we will have dermatology See as needed and excise this.  - Ambulatory referral to Dermatology    3. Left ventricular thrombus following MI (H)  Due to see his cardiologist soon.  He has a history of a left ventricular thrombus in 2013 and has been on anticoagulation therapy with warfarin ever since.  Has not had an echocardiogram for many years.  Is it possible that he does not need to be on warfarin therapy any longer?  - Lipid Cascade RANDOM  - INR          No follow-ups on file.     History of Present Illness   This 68 y.o. old is on long-term warfarin therapy.  He now will get his INR is done through our clinic anticoagulation management team instead of the heart care.  He is transitioning now.  That he come in to see me.  I have not seen him in recent months.  Last visit here was in 6/2018.  He reports no chest pain.  No anginal type symptoms.  No shortness of breath.  No excess bleeding.  He comes in complaining that there is a nevus on his foot that he wonders if this is cancer.  He is able to pick away at its of this growth on the foot.  It seems however to be getting larger and it always comes back.  Review of Systems: A comprehensive review of systems was negative except as noted.     Medications, Allergies and Problem List   Reviewed, reconciled and updated     Physical Exam   General Appearance:       /70 (Patient Site: Right Arm, Patient Position: Sitting, Cuff Size: Adult Large)    Pulse 88   Ht 6' (1.829 m)   Wt 218 lb (98.9 kg)   SpO2 96%   BMI 29.57 kg/m      He looks like he has a seborrheic keratosis on the dorsal aspect of his foot.  It is soft and waxy.  It may be a bit of scarring in it given given his trying to pick at this.  It affects his putting his shoe on and thus he should have the entire thing removed.     Additional Information   Current Outpatient Medications   Medication Sig Dispense Refill     aspirin 81 MG EC tablet Take 81 mg by mouth daily.       atorvastatin (LIPITOR) 40 MG tablet Take 1 tablet (40 mg total) by mouth daily. 90 tablet 0     carvedilol (COREG) 6.25 MG tablet Take 1 tablet (6.25 mg total) by mouth 2 (two) times a day with meals. 180 tablet 0     COQ10, UBIQUINOL, ORAL Take 2 capsules by mouth daily.       DOCOSAHEXANOIC ACID/EPA (FISH OIL ORAL) Take 4 capsules by mouth daily.       flaxseed Liqd A couple of ounce every morning per pt-- OTC       lisinopril (PRINIVIL,ZESTRIL) 10 MG tablet Take 1 tablet (10 mg total) by mouth daily. 90 tablet 3     nitroglycerin (NITROSTAT) 0.4 MG SL tablet Place 1 tablet (0.4 mg total) under the tongue as needed. 25 tablet 11     warfarin (COUMADIN/JANTOVEN) 2.5 MG tablet Take 5 mg Monday and Friday and 2.5 mg all other days. 180 tablet 0     No current facility-administered medications for this visit.      No Known Allergies  Social History     Tobacco Use     Smoking status: Current Every Day Smoker     Packs/day: 0.50     Types: Cigarettes     Smokeless tobacco: Never Used   Substance Use Topics     Alcohol use: Yes     Comment: Rare     Drug use: No       Review and/or order of clinical lab tests:  Review and/or order of radiology tests:  Review and/or order of medicine tests:  Discussion of test results with performing physician:  Decision to obtain old records and/or obtain history from someone other than the patient:  Review and summarization of old records and/or obtaining history from someone other than the  patient and.or discussion of case with another health care provider:  Independent visualization of image, tracing or specimen itself:    Time: total time spent with the patient was 15 minutes of which >50% was spent in counseling and coordination of care     Ramírez Pedersen MD

## 2021-05-29 NOTE — TELEPHONE ENCOUNTER
ANTICOAGULATION  MANAGEMENT    Assessment     Today's INR result of 3.30 is Supratherapeutic (goal INR of 2.0-3.0)        More warfarin taken than instructed which may be affecting INR    No new diet changes affecting INR    No new medication/supplements affecting INR    Continues to tolerate warfarin with no reported s/s of bleeding or thromboembolism     Previous INR was Subtherapeutic at 2.30 on 5/3/19.    Plan:     Spoke with Sonny regarding INR result and instructed:     Warfarin Dosing Instructions:  (has 2.5mg tabs)   -  Reported taking 1.25mg warfarin dose on 6/4/19 as instructed.   -  Continue current warfarin dose 5 mg daily on Mon/Fri; and 2.5 mg daily rest of week.    Instructed patient to follow up no later than:  2 wks.   - scheduled on 6/18/19 @ [facility].    Education provided: target INR goal and significance of current INR result and importance of notifying clinic for changes in medications    Sonny verbalizes understanding and agrees to warfarin dosing plan.    Instructed to call the [facility] for any changes, questions or concerns. (#[phone])   ?   Bev Suh RN    Subjective/Objective:      Claudio Chambers, a 68 y.o. male is on warfarin.     Claudio reports:     Home warfarin dose: verbally confirmed home dose with Sonny and updated on anticoagulation calendar     Missed doses: No     Medication changes:  Yes:  Sonny reported on last INR was sub-therapeutic on 5/3/19 and Jeanie Johansen increased his dose to 5mg on Mon/Fri and 2.5mg all other days, since 5/3.     S/S of bleeding or thromboembolism:  No     New Injury or illness:  No.  Reported having a  Recurrent left foot infection which has resolved.     Changes in diet or alcohol consumption:  No     Upcoming surgery, procedure or cardioversion:  No    Anticoagulation Episode Summary     Current INR goal:   2.0-3.0   TTR:   59.0 % (3.6 y)   Next INR check:   6/18/2019   INR from last check:   3.30! (6/4/2019)   Weekly max warfarin  dose:      Target end date:      INR check location:      Preferred lab:      Send INR reminders to:   [facility]    Indications    Left ventricular thrombus following MI (H) [I23.6]  Paroxysmal ventricular tachycardia (H) [I47.2]           Comments:   new INR order 3/5/19         Anticoagulation Care Providers     Provider Role Specialty Phone number    Ramírez Pedersen MD [facility] [phone]

## 2021-05-29 NOTE — PROGRESS NOTES
In clinic device check with Device RN\.  Please see link for full device report.  Patient was informed of results and next follow up during today's visit.

## 2021-05-29 NOTE — TELEPHONE ENCOUNTER
No procedure has been scheduled yet. Waiting for hold and/or bridge orders. Please call [facility]. -ejb

## 2021-05-29 NOTE — TELEPHONE ENCOUNTER
----- Message from Mikael Jett sent at 6/12/2019  2:55 PM CDT -----  Contact: [facility]  General phone call:    Caller: MARIVEL OSMAN    Primary cardiologist: Dr Weldon    Detailed reason for call: HOLD.BRIDGE orders - upcoming colonoscopy -   Typically they would do 4 days    New or active symptoms? na    [phone]  [address] - ext [phone]

## 2021-05-29 NOTE — TELEPHONE ENCOUNTER
Refill Approved    Rx renewed per Medication Renewal Policy. Medication was last renewed on 4/23/19.    Jeanie Cespedes, [facility] Connection Triage/Med Refill 6/20/2019     Requested Prescriptions   Pending Prescriptions Disp Refills     carvedilol (COREG) 6.25 MG tablet [Pharmacy Med Name: CARVEDILOL 6.25MG TABLETS] 180 tablet 0     Sig: TAKE 1 TABLET BY MOUTH TWICE DAILY       Beta-Blockers Refill Protocol Passed - 6/19/2019  9:36 AM        Passed - PCP or prescribing provider visit in past 12 months or next 3 months     Last office visit with prescriber/PCP: Visit date not found OR same dept: 6/4/2019 Ramírez Pedersen MD OR same specialty: 6/4/2019 Ramírez Pedersen MD  Last physical: Visit date not found Last MTM visit: Visit date not found   Next visit within 3 mo: Visit date not found  Next physical within 3 mo: Visit date not found  Prescriber OR PCP: Denny Mejias MD  Last diagnosis associated with med order: 1. CAD (coronary artery disease)  - carvedilol (COREG) 6.25 MG tablet [Pharmacy Med Name: CARVEDILOL 6.25MG TABLETS]; TAKE 1 TABLET BY MOUTH TWICE DAILY  Dispense: 180 tablet; Refill: 0    If protocol passes may refill for 12 months if within 3 months of last provider visit (or a total of 15 months).             Passed - Blood pressure filed in past 12 months     BP Readings from Last 1 Encounters:   06/12/19 100/68

## 2021-05-29 NOTE — TELEPHONE ENCOUNTER
Dr. Pedersen - OK to wait for Dr. Pedersen.     - [facility] would like to schedule Colonoscopy on Dennis Jeansirisha.     - they would like an OK for a 4 day warfarin hold?     - will he need bridging while off warfarin?     - Please advise.

## 2021-05-29 NOTE — TELEPHONE ENCOUNTER
Anticoagulation Transition of Care    Claudio Chambers was referred to transition management of warfarin from [facility] to [facility].    Warfarin indication: LV mural thrombus following MI   Current INR goal 2.0-3.0   Date of last primary care office visit 06/26/2018     If you are agreeable, [facility] will assume warfarin management for your patient with you as the listed referring provider.     Please confirm agreement by routing back your response. No further action is necessary (referral orders,etc).    Bev Suh RN

## 2021-05-29 NOTE — TELEPHONE ENCOUNTER
ANTICOAGULATION  MANAGEMENT    Assessment     Today's INR result of 2.30 is Therapeutic (goal INR of 2.0-3.0)        Warfarin taken as previously instructed    No new diet changes affecting INR    No new medication/supplements affecting INR    Continues to tolerate warfarin with no reported s/s of bleeding or thromboembolism     Previous INR was Supratherapeutic at 3.30 on 6/4/19    Plan:     Spoke with Sonny regarding INR result and instructed:     Warfarin Dosing Instructions:   (has 2.5mg tabs)   - Continue current warfarin dose 5 mg daily on Mon/Fri; and 2.5 mg daily rest of week.    Instructed patient to follow up no later than:  2 wks.    Education provided: importance of therapeutic range, importance of notifying clinic for changes in medications and importance of notifying clinic of upcoming surgeries and procedures 2 weeks in advance    Sonny, verbalizes understanding and agrees to warfarin dosing plan.    Instructed to call the [facility] for any changes, questions or concerns. (#[phone])   ?   Bev Suh RN    Subjective/Objective:      Claudio Chambers, a 69 y.o. male is on warfarin.     Claudio reports:     Home warfarin dose: as updated on anticoagulation calendar per template     Missed doses: Yes:  Reported missing once 2 wks ago.     Medication changes:  No     S/S of bleeding or thromboembolism:  Yes:  LEFT foot bled has appt with Dermatologist next week on 6/18/19. Reported, they might do procedure and would need to hold warfarin.     New Injury or illness:  Yes:  Reported having the Flu last week. But feeling better now.     Changes in diet or alcohol consumption:  No     Upcoming surgery, procedure or cardioversion:  Yes:  Will be scheduled for colonoscopy thru [facility] and will hold warfarin for 4 days prior to procedure.  No bridging as instructed per Dr. Pedersen.    Anticoagulation Episode Summary     Current INR goal:   2.0-3.0   TTR:   59.1 % (3.6 y)   Next INR check:   7/2/2019    INR from last check:   2.30 (6/18/2019)   Weekly max warfarin dose:      Target end date:      INR check location:      Preferred lab:      Send INR reminders to:   [facility]    Indications    Left ventricular thrombus following MI (H) [I23.6]  Paroxysmal ventricular tachycardia (H) [I47.2]           Comments:   new INR order 3/5/19         Anticoagulation Care Providers     Provider Role Specialty Phone number    Ramírez Pedersen MD [facility] [phone]

## 2021-05-30 VITALS — BODY MASS INDEX: 30.34 KG/M2 | HEIGHT: 72 IN | WEIGHT: 224 LBS

## 2021-05-30 NOTE — TELEPHONE ENCOUNTER
"ANTICOAGULATION  MANAGEMENT    Assessment     Today's INR result of 3.20 is Supratherapeutic (goal INR of 2.0-3.0)        Warfarin recently held as instructed which may be affecting INR    Increased greens/vitamin K intake may be affecting INR    Potential interaction between Metronidazole / Cipro and warfarin which may affect subsequent INRs    Continues to tolerate warfarin with no reported s/s of bleeding or thromboembolism     Previous INR was Supratherapeutic at 4.30 on 7/17/19.    Plan:     Spoke with Sonny regarding INR result and instructed:    1.  Will complete Metronidazole and Cipro on 7/23/19.    Warfarin Dosing Instructions:   (has 2.5mg tabs)   - advised to take 2.5mg warfarin dose daily, till next INR check.    Instructed patient to follow up no later than:  Scheduled on Tues. 7/23/19 @ [facility].    Education provided: target INR goal and significance of current INR result, potential interaction between warfarin and Metronidazole / Cipro and importance of notifying clinic for changes in medications    Sonny verbalizes understanding and agrees to warfarin dosing plan.    Instructed to call the [facility] for any changes, questions or concerns. (#[phone])   ?   Bev Suh RN    Subjective/Objective:      Claudio BARKSDALE Reyes, a 69 y.o. male is on warfarin.     Claudio reports:     Home warfarin dose: as updated on anticoagulation calendar per template     Missed doses: Yes:  Held warfarin on 7/17-18, as instructed.     Medication changes: Yes:      10 days, 7/11 to 22/19 - Cipro 500mg two times a day, and                                                  10 day, 7/11 - 22/19 - Metronidazole 500mg three times a day.               S/S of bleeding or thromboembolism:  No     New Injury or illness:  Yes:  Flare-up of acute Diverticulitis has improved greatly.     Changes in diet or alcohol consumption:  Yes:  Reported eating more \" greens, \" as instructed.     Upcoming surgery, procedure or " cardioversion:  No    Anticoagulation Episode Summary     Current INR goal:   2.0-3.0   TTR:   58.7 % (3.7 y)   Next INR check:   7/24/2019   INR from last check:   3.20! (7/19/2019)   Weekly max warfarin dose:      Target end date:      INR check location:      Preferred lab:      Send INR reminders to:   [facility]    Indications    Left ventricular thrombus following MI (H) [I23.6]  Paroxysmal ventricular tachycardia (H) [I47.2]           Comments:   new INR order 3/5/19         Anticoagulation Care Providers     Provider Role Specialty Phone number    Ramírez Pedersen MD [facility] Internal Medicine [phone]

## 2021-05-30 NOTE — TELEPHONE ENCOUNTER
ANTICOAGULATION  MANAGEMENT    Assessment     Today's INR result of 1.50 is Subtherapeutic (goal INR of 2.0-3.0)        NOTE:  Sonny never calls back, despite numerous calls on 7/23-25 for warfarin dose instructions.   - self-dose and HELD warfarin doses from 7/24 - 25.   - he missed warfarin dose on 7/23.    No new diet changes affecting INR    No new medication/supplements affecting INR    Continues to tolerate warfarin with no reported s/s of bleeding or thromboembolism     Previous INR was Supratherapeutic at 3.40 on 7/23/19.    Plan:     Left a detailed message for Sonny regarding INR result and instructed:    1.  Despite several calls to Dennis since 7/23/19, he has not returned calls for warfarin dosing instructions.   2.  Has appt with Dr. Pedersen on 8/2/19.    Warfarin Dosing Instructions:  (has 2.5mg tabs)    - if he follows instructions:   - today, 7/26 advised one time booster with 5mg warfarin dose   - thereafter, resume and continue same dose with 5mg on Monday / Friday and 2.5mg all other days.         Instructed patient to follow up no later than:  1-2 wks.   - he scheduled next INR on 8/2/19 @ [facility].    Subjective/Objective:      Claudio BARKSDALE Reyes, a 69 y.o. male is on warfarin.     Claudio reports: per template.    Home warfarin dose: patient self-dosed.     Missed doses: Yes: one missed dose on 7/23.     Medication changes:  Yes:  Sonny self-dosed and HELD warfarin from 7/24 - 25.   - numerous calls were made to patient on 7/23, with no call back for warfarin dose instructions by [facility].     S/S of bleeding or thromboembolism:  No     New Injury or illness:  No     Changes in diet or alcohol consumption:  No     Upcoming surgery, procedure or cardioversion:  No    Anticoagulation Episode Summary     Current INR goal:   2.0-3.0   TTR:   58.5 % (3.7 y)   Next INR check:   8/2/2019   INR from last check:   1.50! (7/26/2019)   Weekly max warfarin dose:      Target end date:      INR check location:       Preferred lab:      Send INR reminders to:   [facility]    Indications    Left ventricular thrombus following MI (H) [I23.6]  Paroxysmal ventricular tachycardia (H) [I47.2]           Comments:   new INR order 3/5/19         Anticoagulation Care Providers     Provider Role Specialty Phone number    Ramírez Pedersen MD [facility] [phone]

## 2021-05-30 NOTE — TELEPHONE ENCOUNTER
ANTICOAGULATION  MANAGEMENT    Assessment     Today's INR result of 4.30 is Supratherapeutic (goal INR of 2.0-3.0)        Warfarin taken as previously instructed    No new diet changes affecting INR    Potential interaction between Metronidazole / Cipro and warfarin which may affect subsequent INRs    Continues to tolerate warfarin with no reported s/s of bleeding or thromboembolism     Previous INR was Subtherapeutic at 1.60 on 7/12/19, d/t self-dosed and held 2 days doses.    Plan:     Spoke with Sonny regarding INR result and instructed:    1.  Having Sonny hold warfarin for  2 days and recheck INR in 2 days prior to resuming warfarin.   2.  He is on 6th day of CIPRO and Metronidazole.    Warfarin Dosing Instructions:   (has 2.5mg tabs)   - advised to HOLD warfarin doses for 2 days, 7/17-18     Instructed patient to follow up no later than:  Scheduled to check this Fri. 7/19/19 @ [facility].    Education provided: importance of consistent vitamin K intake, impact of vitamin K foods on INR, target INR goal and significance of current INR result, potential interaction between warfarin and CIPRO / Metronidazole and importance of notifying clinic for changes in medications    Sonny verbalizes understanding and agrees to warfarin dosing plan.    Instructed to call the [facility] for any changes, questions or concerns. (#[phone])   ?   Bev Suh RN    Subjective/Objective:      Claudio SHAMIR Chambers, a 69 y.o. male is on warfarin.     Claudio reports:     Home warfarin dose: as updated on anticoagulation calendar per template     Missed doses: No     Medication changes:  Yes:   10 days, 7/10 to 20/19 - Cipro 500mg two times a day, and                            10 day, 7/10 - 20/19 - Metronidazole 500mg three times a day.                       S/S of bleeding or thromboembolism:  No     New Injury or illness:  Yes:  Flare-up of acute Diverticulitis has improved greatly.     Changes in diet or alcohol consumption:   No     Upcoming surgery, procedure or cardioversion:  No    Anticoagulation Episode Summary     Current INR goal:   2.0-3.0   TTR:   58.8 % (3.7 y)   Next INR check:   7/19/2019   INR from last check:   4.30! (7/17/2019)   Weekly max warfarin dose:      Target end date:      INR check location:      Preferred lab:      Send INR reminders to:   [facility]    Indications    Left ventricular thrombus following MI (H) [I23.6]  Paroxysmal ventricular tachycardia (H) [I47.2]           Comments:   new INR order 3/5/19         Anticoagulation Care Providers     Provider Role Specialty Phone number    Ramírez Pedersen MD [facility] Internal Medicine [phone]

## 2021-05-30 NOTE — TELEPHONE ENCOUNTER
Anticoagulation Management    Unable to reach Dennis today.    Today's INR result of 3.40 is Supratherapeutic (goal INR of 2.0-3.0)     Follow up required to discuss out of range INR    Left message to hold warfarin tonight      Missed doses: Yes:  Reported missing warfarin dose on 7/22.  Medication changes:  Yes:     - 10 days, 7/11 to 23/19 - Cipro 500mg two times a day.   - 10 day, 7/11 - 23/19 - Metronidazole 500mg three times a day.   New Injury or illness:  Yes:   Reported dizziness, slight headache, sleepy and weak.   - Had flare-up of acute diverticulitis.                  [facility] to follow up - on Wed. 7/24.    Bev Suh RN

## 2021-05-30 NOTE — TELEPHONE ENCOUNTER
ANTICOAGULATION  MANAGEMENT    Assessment     Today's INR result of 1.60 is Subtherapeutic (goal INR of 2.0-3.0)        Warfarin recently held as instructed which may be affecting INR    No new diet changes affecting INR    Potential interaction between CIPRO / Metronidazole and warfarin which may affect subsequent INRs    Continues to tolerate warfarin with no reported s/s of bleeding or thromboembolism     Previous INR was Therapeutic at 2.30 on 6/18/19    Plan:     Spoke with Sonny regarding INR result and instructed:     Warfarin Dosing Instructions:  (has 2.5mg tabs)   - today, advised one time booster with 7.5mg warfarin dose,   - then continue current warfarin dose 5 mg daily on Monday / Friday; and 2.5 mg daily rest of week.    Instructed patient to follow up no later than:  Within one wk.   - scheduled on 7/17/19 @ [facility].    Education provided: target INR goal and significance of current INR result, potential interaction between warfarin and CIPRO / Metronidazole and importance of notifying clinic for changes in medications    Dennis, verbalizes understanding and agrees to warfarin dosing plan.    Instructed to call the [facility] Clinic for any changes, questions or concerns. (#[phone])   ?   Bev Suh RN    Subjective/Objective:      Claudio Penadusty, a 69 y.o. male is on warfarin.     Claudio reports:     Home warfarin dose: verbally confirmed home dose with Sonny and updated on anticoagulation calendar     Missed doses: Yes:  Reported holding 2 days doses of warfarin, unsure of day.     Medication changes:  Yes:  10 days, 7/10 to 20/19 - Cipro 500mg two times a day, and                          10 day, 7/10 - 20/19 - Metronidazole 500mg three times a day.                        S/S of bleeding or thromboembolism:  No     New Injury or illness:  Yes:  Reported abdominal pains have improved after starting Cipro / Metonidazole.   - Flare-up of acute Diverticulitis. Had dermatology appt on 6/25/19 for  skin biopsy.     Changes in diet or alcohol consumption:  No     Upcoming surgery, procedure or cardioversion:  No    Anticoagulation Episode Summary     Current INR goal:   2.0-3.0   TTR:   58.8 % (3.7 y)   Next INR check:   7/19/2019   INR from last check:   1.60! (7/12/2019)   Weekly max warfarin dose:      Target end date:      INR check location:      Preferred lab:      Send INR reminders to:   [facility]    Indications    Left ventricular thrombus following MI (H) [I23.6]  Paroxysmal ventricular tachycardia (H) [I47.2]           Comments:   new INR order 3/5/19         Anticoagulation Care Providers     Provider Role Specialty Phone number    Ramírez Pedersen MD [facility] Internal Medicine [phone]

## 2021-05-30 NOTE — TELEPHONE ENCOUNTER
ANTICOAGULATION  MANAGEMENT PROGRAM    Claudio Chambers is overdue for INR check.  Reminder call made.    Left message for Claudio reminding them to have INR checked at upcoming office visit on 7/9/19    Bev Suh RN

## 2021-05-30 NOTE — TELEPHONE ENCOUNTER
Called and left detailed message with Sonny.     - he has not called yet.     - advised to HOLD warfarin dose last night, 7/23.     - just completed a 10 day course of CIPRO / Metronidazole.     - advised to resume his usual warfarin dose tonight, 7/24.     - and to call me back.

## 2021-05-30 NOTE — PROGRESS NOTES
Office Visit - Follow Up   Claudio Chambers   69 y.o. male    Date of Visit: 7/9/2019    Chief Complaint   Patient presents with     Concerns     having sx similar to previous dx of diverticulitis, sx of left lower ab pain when eating, sx started Sunday     Dizziness     sx this morning. pt chose not to take his warfarin medication this morning        Assessment and Plan   1. Acute diverticulitis  His symptoms are suggestive of diverticulitis in the same area as above 2016.  Reviewed with him the CT of the abdomen pelvis then.  I told him we should be sure that this is diverticulitis especially given his long-term need for warfarin therapy.  We thus did a CT scan of the abdomen today and this confirms left lower quadrant spot of diverticulitis in the distal descending colon.  Signs of any abscess.  Patient was called with these results and a message was left.  We will start him on a 10-day course of Cipro and a 10-day course of Flagyl both orally he should get a INR checked in 5 to 7 days.  - CT Abdomen Pelvis Without Oral Without IV Contrast; Future          No follow-ups on file.     History of Present Illness   This 69 y.o. old reports an onset 48 hours ago of left lower quadrant abdominal pain.  He reports no feverishness he is at a slight amount of chills.  Reports no blood in his urine no melena.  He he reports he has been a bit constipated and thinks it may be gets just constipation but it feels like his previous bout of diverticulitis although not as severe.    Review of Systems: A comprehensive review of systems was negative except as noted.     Medications, Allergies and Problem List   Reviewed, reconciled and updated  Post Discharge Medication Reconciliation Status:      Physical Exam   General Appearance:       /60 (Patient Site: Left Arm)   Pulse 75   Ht 6' (1.829 m)   Wt 215 lb (97.5 kg)   SpO2 98%   BMI 29.16 kg/m      He has tenderness to palpation in the left lower quadrant of his  abdomen with a mild amount of guarding.  No rebound tenderness.  No tenderness throughout the remainder his abdomen.     Additional Information   Current Outpatient Medications   Medication Sig Dispense Refill     aspirin 81 MG EC tablet Take 81 mg by mouth daily.       atorvastatin (LIPITOR) 40 MG tablet Take 1 tablet (40 mg total) by mouth daily. 90 tablet 0     carvedilol (COREG) 6.25 MG tablet Take 1 tablet (6.25 mg total) by mouth 2 (two) times a day with meals. 180 tablet 0     COQ10, UBIQUINOL, ORAL Take 2 capsules by mouth daily.       DOCOSAHEXANOIC ACID/EPA (FISH OIL ORAL) Take 4 capsules by mouth daily.       flaxseed Liqd A couple of ounce every morning per pt-- OTC       lisinopril (PRINIVIL,ZESTRIL) 10 MG tablet Take 1 tablet (10 mg total) by mouth daily. 90 tablet 3     nitroglycerin (NITROSTAT) 0.4 MG SL tablet Place 1 tablet (0.4 mg total) under the tongue as needed. 25 tablet 11     warfarin (COUMADIN/JANTOVEN) 2.5 MG tablet Take 5 mg Monday and Friday and 2.5 mg all other days. 180 tablet 0     ciprofloxacin HCl (CIPRO) 500 MG tablet Take 1 tablet (500 mg total) by mouth 2 (two) times a day for 10 days. 20 tablet 0     metroNIDAZOLE (FLAGYL) 500 MG tablet Take 1 tablet (500 mg total) by mouth 3 (three) times a day for 10 days. 30 tablet 0     No current facility-administered medications for this visit.      No Known Allergies  Social History     Tobacco Use     Smoking status: Current Every Day Smoker     Packs/day: 0.50     Types: Cigarettes     Smokeless tobacco: Never Used   Substance Use Topics     Alcohol use: Yes     Comment: Rare     Drug use: No       Review and/or order of clinical lab tests:  Review and/or order of radiology tests:  Review and/or order of medicine tests:  Discussion of test results with performing physician:  Decision to obtain old records and/or obtain history from someone other than the patient:  Review and summarization of old records and/or obtaining history from  someone other than the patient and.or discussion of case with another health care provider:  Independent visualization of image, tracing or specimen itself:    Time: total time spent with the patient was 25 minutes of which >50% was spent in counseling and coordination of care     Ramírez Pedersen MD

## 2021-05-30 NOTE — TELEPHONE ENCOUNTER
Dr. Pedersen - GLENDY     - still not able to reach [facility].     - sent warfarin instructions on [employer] today.

## 2021-05-30 NOTE — TELEPHONE ENCOUNTER
Radha, I started Sonny on Cipro and Flagyl for acute diverticulitis.  Suggest he have an INR done in 5 to 7 days while on this medication.  Please contact him for this.

## 2021-05-30 NOTE — TELEPHONE ENCOUNTER
.ANTICOAGULATION  MANAGEMENT - BPA Interacting Medication Review    Interacting medication(s): Metronidazole (Flagyl) and Cipro with warfarin.   - for acute diverticulitis.   - will start today, 7/10/19.    Duration:      10 days, 7/10 to 20/19 - Cipro 500mg two times a day.        10 day, 7/10 - 20/19 - Metronidazole 500mg three times a day.      New medication?:  Yes, interaction per [employer], may increase INR and risk of bleeding.    Plan:    Spoke with Sonny regarding potential interaction.     Warfarin instructions: continue current warfarin dose    Follow up INR recommended in:   Check INR this Friday, 7/12/19.     Anticoagulation calendar updated    Bev Suh RN

## 2021-05-31 VITALS — BODY MASS INDEX: 29.97 KG/M2 | WEIGHT: 221 LBS

## 2021-05-31 VITALS — BODY MASS INDEX: 30.38 KG/M2 | WEIGHT: 224 LBS

## 2021-05-31 NOTE — TELEPHONE ENCOUNTER
Dr. Pedersen,     - Please advise.     - Sonny does not want me to manage his INR or warfarin doses anymore.       - I was concerned with INR was sub-therapeutic at 1.50 and I was just doing my job as ACN in calling him so we can prevent any clot formations with subpar INR.  He wrote and provided on his template the telephone number to call and reach him, which I did try to call.  However, he never called back.       - as you know he was the one who reported not feeling good with his flare-up of Diverticulitis and was prescrbed a 10 day course of Metronidazole and Cipro from 7/9 - 19, which has known MAJOR interaction with warfarin to increase risk for bleeding, r/t his diverticulitis flare-up.  This was the cause of his elevated INR.  Which I previously informed him would occur, and he verbalized understanding.     - in addition, he was self-dosing his warfarin.     - I can also have another ACN take over his warfarin dosing.

## 2021-05-31 NOTE — TELEPHONE ENCOUNTER
Radha, I think that it is a good idea for Sonny to try working with another ACM nurse.  I don't think any of this is due to your good management but only his problem with changing to our [facility] ACM program.  Thanks,  Tim Pedersen

## 2021-05-31 NOTE — TELEPHONE ENCOUNTER
ANTICOAGULATION  MANAGEMENT    Assessment     Today's INR result of 2.1 is Therapeutic (goal INR of 2.0-3.0)        Warfarin taken as previously instructed    No new diet changes affecting INR    No new medication/supplements affecting INR    Continues to tolerate warfarin with no reported s/s of bleeding or thromboembolism     Previous INR was Therapeutic    Plan:     Left a detailed message for Claudio in Comic Reply regarding INR result and instructed:     Warfarin Dosing Instructions:  Continue current warfarin dose 5 mg daily on mon/fri; and 2.5 mg daily rest of week  (0 % change)    Instructed patient to follow up no later than: one month      Instructed to call the [facility] for any changes, questions or concerns. (#[phone])   ?   Deneen Bond RN    Subjective/Objective:      Claudio Chambers, a 69 y.o. male is on warfarin.     Claudio reports:     Home warfarin dose: as updated on anticoagulation calendar per template     Missed doses: No     Medication changes:  No     S/S of bleeding or thromboembolism:  No     New Injury or illness:  No     Changes in diet or alcohol consumption:  No     Upcoming surgery, procedure or cardioversion:  No    Anticoagulation Episode Summary     Current INR goal:   2.0-3.0   TTR:   58.7 % (3.8 y)   Next INR check:   9/13/2019   INR from last check:   2.10 (8/16/2019)   Weekly max warfarin dose:      Target end date:      INR check location:      Preferred lab:      Send INR reminders to:   [facility]    Indications    Left ventricular thrombus following MI (H) [I23.6]  Paroxysmal ventricular tachycardia (H) [I47.2]           Comments:   new INR order 3/5/19         Anticoagulation Care Providers     Provider Role Specialty Phone number    Ramírez Pedersen MD [facility] Internal Medicine [phone]

## 2021-05-31 NOTE — TELEPHONE ENCOUNTER
"ANTICOAGULATION  MANAGEMENT    Assessment     Today's INR result of 2.1 is Therapeutic (goal INR of 2.0-3.0)        Warfarin taken as previously instructed    No new diet changes affecting INR    No new medication/supplements affecting INR    Continues to tolerate warfarin with no reported s/s of bleeding or thromboembolism     Previous INR was Subtherapeutic    Plan:     Messaged Sonny via [employer] (Inspect Manager) as he wrote \"don't call\" on his template regarding INR result and instructed:     Warfarin Dosing Instructions:  Continue current warfarin dose 5 mg daily on Mon/Fri; and 2.5 mg daily rest of week  (0 % change)    Instructed patient to follow up no later than: two weeks    Education provided: importance of therapeutic range    Instructed to call the [facility] for any changes, questions or concerns. (#[phone])   ?   Emeli Engle RN    Subjective/Objective:      Claudio BARKSDALE Leah, a 69 y.o. male is on warfarin.     Claudio reports:     Home warfarin dose: as updated on anticoagulation calendar per template     Missed doses: No     Medication changes:  No     S/S of bleeding or thromboembolism:  No     New Injury or illness:  No     Changes in diet or alcohol consumption:  No     Upcoming surgery, procedure or cardioversion:  No    Anticoagulation Episode Summary     Current INR goal:   2.0-3.0   TTR:   58.3 % (3.8 y)   Next INR check:   8/16/2019   INR from last check:   2.10 (8/2/2019)   Weekly max warfarin dose:      Target end date:      INR check location:      Preferred lab:      Send INR reminders to:   [facility]    Indications    Left ventricular thrombus following MI (H) [I23.6]  Paroxysmal ventricular tachycardia (H) [I47.2]           Comments:   new INR order 3/5/19         Anticoagulation Care Providers     Provider Role Specialty Phone number    Ramírez Pedersen MD [facility] Internal Medicine [phone]        "

## 2021-06-01 VITALS — WEIGHT: 219 LBS | BODY MASS INDEX: 29.66 KG/M2 | HEIGHT: 72 IN

## 2021-06-01 VITALS — BODY MASS INDEX: 28.99 KG/M2 | WEIGHT: 214 LBS | HEIGHT: 72 IN

## 2021-06-01 VITALS — HEIGHT: 72 IN | BODY MASS INDEX: 29.26 KG/M2 | WEIGHT: 216 LBS

## 2021-06-01 VITALS — HEIGHT: 72 IN | WEIGHT: 216 LBS | BODY MASS INDEX: 29.26 KG/M2

## 2021-06-01 VITALS — BODY MASS INDEX: 28.85 KG/M2 | HEIGHT: 72 IN | WEIGHT: 213 LBS

## 2021-06-01 VITALS — BODY MASS INDEX: 28.85 KG/M2 | WEIGHT: 213 LBS | HEIGHT: 72 IN

## 2021-06-01 NOTE — TELEPHONE ENCOUNTER
ANTICOAGULATION  MANAGEMENT    Assessment     Today's INR result of 2.7 is Therapeutic (goal INR of 2.0-3.0)        Warfarin taken as previously instructed    No new diet changes affecting INR    No new medication/supplements affecting INR    Continues to tolerate warfarin with no reported s/s of bleeding or thromboembolism     Previous INR was Therapeutic    Plan:     Left message for Sonny via [email or web address] (per patient request)    Warfarin Dosing Instructions:  Continue current warfarin dose 5 mg daily on Mon/Fri; and 2.5 mg daily rest of week  (0 % change)    Instructed patient to follow up no later than: 4-6 weeks    Instructed to call the [facility] for any changes, questions or concerns. (#[phone])   ?   Emeli Engle RN    Subjective/Objective:      Claudio SHAMIR Chambers, a 69 y.o. male is on warfarin.     Claudio reports:     Home warfarin dose: as updated on anticoagulation calendar per template     Missed doses: No     Medication changes:  No     S/S of bleeding or thromboembolism:  No     New Injury or illness:  No     Changes in diet or alcohol consumption:  No     Upcoming surgery, procedure or cardioversion:  No    Anticoagulation Episode Summary     Current INR goal:   2.0-3.0   TTR:   75.7 %   Next INR check:   10/18/2019   INR from last check:   2.70 (9/6/2019)   Weekly max warfarin dose:      Target end date:      INR check location:      Preferred lab:      Send INR reminders to:   [facility]    Indications    Left ventricular thrombus following MI (H) [I23.6]  Paroxysmal ventricular tachycardia (H) [I47.2]           Comments:   new INR order 3/5/19         Anticoagulation Care Providers     Provider Role Specialty Phone number    Ramírez Pedersen MD [facility] Internal Medicine [phone]

## 2021-06-02 NOTE — TELEPHONE ENCOUNTER
ANTICOAGULATION  MANAGEMENT    Assessment     Today's INR result of 2.7 is Therapeutic (goal INR of 2.0-3.0)        Warfarin taken as previously instructed    No new diet changes affecting INR    No new medication/supplements affecting INR    Continues to tolerate warfarin with no reported s/s of bleeding or thromboembolism     Previous INR was Therapeutic    Plan:     Left instructions in [employer] message per patient request    Warfarin Dosing Instructions:  Continue current warfarin dose 5 mg daily on Mon/Fri; and 2.5 mg daily     Instructed patient to follow up no later than: 4-6 weeks    Instructed to call the [facility] for any changes, questions or concerns. (#[phone])   ?   Emeli Engle RN    Subjective/Objective:      Claudio Chambers, a 69 y.o. male is on warfarin.     Claudio reports:     Home warfarin dose: as updated on anticoagulation calendar per template     Missed doses: No     Medication changes:  No     S/S of bleeding or thromboembolism:  No     New Injury or illness:  No     Changes in diet or alcohol consumption:  No     Upcoming surgery, procedure or cardioversion:  No    Anticoagulation Episode Summary     Current INR goal:   2.0-3.0   TTR:   83.3 %   Next INR check:   11/15/2019   INR from last check:   2.70 (10/4/2019)   Weekly max warfarin dose:      Target end date:      INR check location:      Preferred lab:      Send INR reminders to:   [facility]    Indications    Left ventricular thrombus following MI (H) [I23.6]  Paroxysmal ventricular tachycardia (H) [I47.2]           Comments:   new INR order 3/5/19         Anticoagulation Care Providers     Provider Role Specialty Phone number    Ramírez Pedersen MD [facility] Internal Medicine [phone]

## 2021-06-03 VITALS — WEIGHT: 213.4 LBS | BODY MASS INDEX: 28.91 KG/M2 | HEIGHT: 72 IN

## 2021-06-03 VITALS — WEIGHT: 216 LBS | HEIGHT: 72 IN | BODY MASS INDEX: 29.26 KG/M2

## 2021-06-03 VITALS — BODY MASS INDEX: 29.53 KG/M2 | HEIGHT: 72 IN | WEIGHT: 218 LBS

## 2021-06-03 VITALS — BODY MASS INDEX: 29.12 KG/M2 | HEIGHT: 72 IN | WEIGHT: 215 LBS

## 2021-06-03 NOTE — TELEPHONE ENCOUNTER
ANTICOAGULATION  MANAGEMENT    Assessment     Today's INR result of 3.5 is Supratherapeutic (goal INR of 2.0-3.0)        Warfarin taken as previously instructed    No new diet changes affecting INR    No new medication/supplements affecting INR    Continues to tolerate warfarin with no reported s/s of bleeding or thromboembolism     Previous INR was Therapeutic    Plan:     Left a detailed message for Claudio on [employer] (patient request) regarding INR result and instructed:     Warfarin Dosing Instructions:  one time lowering dose of 2.5mg tonight then continue current warfarin dose 5 mg daily on Mon/Fri; and 2.5 mg daily rest of week  (0 % change)    Instructed patient to follow up no later than: two weeks    Education provided: impact of vitamin K foods on INR, importance of therapeutic range, target INR goal and significance of current INR result and monitoring for bleeding signs and symptoms    Instructed to call the [facility] for any changes, questions or concerns. (#[phone])   ?   Emeli Engle RN    Subjective/Objective:      Claudio Chambers, a 69 y.o. male is on warfarin.     Claudio reports:     Home warfarin dose: as updated on anticoagulation calendar per template     Missed doses: No     Medication changes:  No     S/S of bleeding or thromboembolism:  No     New Injury or illness:  No     Changes in diet or alcohol consumption:  No     Upcoming surgery, procedure or cardioversion:  No    Anticoagulation Episode Summary     Current INR goal:   2.0-3.0   TTR:   77.4 %   Next INR check:   11/29/2019   INR from last check:   3.50! (11/15/2019)   Weekly max warfarin dose:      Target end date:      INR check location:      Preferred lab:      Send INR reminders to:   [facility]    Indications    Left ventricular thrombus following MI (H) [I23.6]  Paroxysmal ventricular tachycardia (H) [I47.2]           Comments:   new INR order 3/5/19         Anticoagulation Care Providers      Provider Role Specialty Phone number    Ramírez Pedersen MD [facility] Internal Medicine [phone]

## 2021-06-03 NOTE — TELEPHONE ENCOUNTER
Sonny Garcia wanted to get his INR at cardiology as he thought they better served his needs.  I will send an order for ACM program if that is what he wants to do

## 2021-06-03 NOTE — TELEPHONE ENCOUNTER
Dr. Pedersen,     - [facility] transferred all INR management to us since this summer.     - Zara Kay does all our [facility] patients.     - another ACM (Emeli) has been contacting Sonny thru Mychart only.

## 2021-06-03 NOTE — TELEPHONE ENCOUNTER
"Anticoagulation Annual Referral Renewal Review    Claudio Chambers's chart reviewed for annual renewal of referral to anticoagulation monitoring.        Criteria for anticoagulation nurse and/or pharmacist renewal met   Warfarin indication: LV mural thrombus Yes, per indication   Current with INR monitoring/compliant Yes Yes   Date of last office visit 07/09/2019 Yes, had office visit within last year   Time in Therapeutic Range (TTR) N/A % TTR not establised yet.  transfer from [facility]       Claudio Chambers did NOT meet all criteria for anticoagulation management program initiated renewal and requires provider review. Using dot phrase, \".acmrenewalprovider\", please advise if Claudio's anticoagulation management referral should be renewed or if patient should be seen in office to review anticoagulation therapy      Bev Suh RN  11:13 AM      "

## 2021-06-03 NOTE — TELEPHONE ENCOUNTER
I enrolled him. You or one of your collegues will have to ask him these questions    Provider Review: Anticoagulation Annual Referral Renewal    ACM Renewal Decision:  Renew ACM warfarin management      INR Range:   Change INR goal range to 2.0-3.0   Anticipated Duration of Therapy (from today):  Long-term anticoagulation      Ramírez Pdeersen MD  3:28 PM

## 2021-06-03 NOTE — TELEPHONE ENCOUNTER
Yes enroll him in our ACM program.   I will have Adelina (who doses all heart patient/s and [facility] patient, that see our cardiologist @ [facility].    Does patient also want to have ALL  INR's done at the [facility]? (They wont dose patient though, and will only do the INR checks), or continue with [facility]?    Or transfer Clinics, if he does not want to be managed by us.  Because [facility] is no longer dosing patient's.    Also, can you have Daily or your nurse to call inform patient about this?

## 2021-06-04 VITALS
OXYGEN SATURATION: 97 % | BODY MASS INDEX: 28.71 KG/M2 | SYSTOLIC BLOOD PRESSURE: 102 MMHG | DIASTOLIC BLOOD PRESSURE: 70 MMHG | HEART RATE: 92 BPM | WEIGHT: 212 LBS | HEIGHT: 72 IN

## 2021-06-04 VITALS
HEIGHT: 72 IN | OXYGEN SATURATION: 98 % | WEIGHT: 216 LBS | SYSTOLIC BLOOD PRESSURE: 120 MMHG | DIASTOLIC BLOOD PRESSURE: 76 MMHG | HEART RATE: 70 BPM | BODY MASS INDEX: 29.26 KG/M2

## 2021-06-04 VITALS
OXYGEN SATURATION: 95 % | HEART RATE: 71 BPM | HEIGHT: 72 IN | BODY MASS INDEX: 29.12 KG/M2 | DIASTOLIC BLOOD PRESSURE: 68 MMHG | WEIGHT: 215 LBS | SYSTOLIC BLOOD PRESSURE: 100 MMHG

## 2021-06-04 NOTE — TELEPHONE ENCOUNTER
ANTICOAGULATION  MANAGEMENT PROGRAM    Claudio BARKSDALE Jeansirisha is overdue for INR check.     Sent a message via [id number] to call and schedule INR appointment as soon as possible.      Zara Kay RN

## 2021-06-05 NOTE — TELEPHONE ENCOUNTER
ANTICOAGULATION  MANAGEMENT    Assessment     Today's INR result of 3.0 is Therapeutic (goal INR of 2.0-3.0)        Warfarin taken as previously instructed    No new diet changes affecting INR    No new medication/supplements affecting INR    Continues to tolerate warfarin with no reported s/s of bleeding or thromboembolism     Previous INR was Supratherapeutic last INR on file was on 11/15/2019    Will remind patient to establish care with another PCP due to Dr Pedersen is now retired.    Plan:     Left a detailed message for Claudio via [employer] regarding INR result and instructed:     Warfarin Dosing Instructions:  Continue current warfarin dose 5 mg daily on Mon/Fri; and 2.5 mg daily rest of week  (0 % change)    Instructed patient to follow up no later than: 4 weeks noted that the patient already made appointment on 2/21/2020    Education provided: importance of therapeutic range and importance of following up for INR monitoring at instructed interval        Instructed to call the [facility] for any changes, questions or concerns. (#[phone])   ?   Zara Kay RN    Subjective/Objective:      Claudio Chambers, a 69 y.o. male is on warfarin.     Claudio reports:     Home warfarin dose: as updated on anticoagulation calendar per template     Missed doses: No     Medication changes:  No     S/S of bleeding or thromboembolism:  No     New Injury or illness:  No     Changes in diet or alcohol consumption:  No     Upcoming surgery, procedure or cardioversion:  No    Anticoagulation Episode Summary     Current INR goal:   2.0-3.0   TTR:   59.0 % (1 y)   Next INR check:   12/13/2019   INR from last check:   No new INR was available at last check   Most recent INR:    3.00 (1/21/2020)   Weekly max warfarin dose:      Target end date:      INR check location:      Preferred lab:      Send INR reminders to:   [facility]    Indications    Left ventricular thrombus following MI (H)  [I23.6]  Paroxysmal ventricular tachycardia (H) [I47.2]           Comments:   new INR order 3/5/19         Anticoagulation Care Providers     Provider Role Specialty Phone number    Ramírez Pedersen MD Responsible Internal Medicine [phone]    [place] Weldon DO Responsible Cardiology [phone]

## 2021-06-06 NOTE — PROGRESS NOTES
Office Visit   Claudio Chambers   69 y.o. male    Date of Visit: 3/6/2020    Chief Complaint   Patient presents with     Get established visit        Assessment and Plan   1. Cardiomyopathy, ischemic  He seems to be euvolemic.  We will check his blood work today.  He has been having some symptoms of lightheadedness so I will get an EKG.  I recommended that he work on his water intake and continue to monitor this.  In the future if he develops lightheadedness it would be helpful if he checked his blood pressure so we did know where it was.  He is going to do this.  - Comprehensive Metabolic Panel  - Thyroid Cascade  - Electrocardiogram Perform - Clinic    2. Lightheadedness  As above we will check labs and also get an EKG today.  He will continue to monitor his blood pressure.  - HM2(CBC w/o Differential)  - Comprehensive Metabolic Panel  - Thyroid Cascade  - Vitamin B12  - Electrocardiogram Perform - Clinic    3. Coronary artery disease due to lipid rich plaque  - Electrocardiogram Perform - Clinic    4. Screening for prostate cancer  - PSA (Prostatic-Specific Antigen), Annual Screen    5. Screening for colon cancer  - Cologuard    6. Need for hepatitis C screening test  - Hepatitis C Antibody (Anti-HCV)    7. Left ventricular thrombus following MI (H)  - INR       Return in about 1 year (around 3/6/2021) for Annual physical.     History of Present Illness   This 69 y.o. old male comes in to establish care.  He has a history of coronary artery disease and had a heart attack with cardiac arrest years ago.  He now has a defibrillator and is on appropriate medications.  His only concern is that he does have some symptoms of lightheadedness on occasion.  Recently had an episode that was low but worse than usual.  He has not had any chest pain or palpitations.  No symptoms of heart failure.  He has no other acute concerns.  He is due for quite a bit of screening.  We discussed options for colon cancer screening and he  would like to proceed with the Cologuard test.  We also discussed doing PSA screening and he would like to get that done as well.  He has no other concerns.    Review of Systems: As above, systems otherwise reviewed and negative.     Medications, Allergies and Problem List   Patient Active Problem List   Diagnosis     ICD (implantable cardioverter-defibrillator), dual, in situ     HLD (hyperlipidemia)     Cardiomyopathy, ischemic     Paroxysmal ventricular tachycardia (H)     Left ventricular thrombus following MI (H)     Coronary artery disease due to lipid rich plaque     Diverticul disease small and large intestine, no perforati or abscess     Current Outpatient Medications   Medication Sig Dispense Refill     aspirin 81 MG EC tablet Take 81 mg by mouth daily.       atorvastatin (LIPITOR) 40 MG tablet Take 1 tablet (40 mg total) by mouth daily. 90 tablet 1     carvedilol (COREG) 6.25 MG tablet Take 1 tablet (6.25 mg total) by mouth 2 (two) times a day with meals. 180 tablet 0     COQ10, UBIQUINOL, ORAL Take 2 capsules by mouth daily.       DOCOSAHEXANOIC ACID/EPA (FISH OIL ORAL) Take 4 capsules by mouth daily.       flaxseed Liqd A couple of ounce every morning per pt-- OTC       lisinopriL (PRINIVIL,ZESTRIL) 10 MG tablet Take 1 tablet (10 mg total) by mouth daily. 90 tablet 3     warfarin (COUMADIN/JANTOVEN) 2.5 MG tablet Take 5 mg Monday and Friday and 2.5 mg all other days. 180 tablet 0     nitroglycerin (NITROSTAT) 0.4 MG SL tablet Place 1 tablet (0.4 mg total) under the tongue as needed. 25 tablet 11     No current facility-administered medications for this visit.      No Known Allergies       Physical Exam     /76 (Patient Site: Right Arm, Patient Position: Sitting)   Pulse 70   Ht 6' (1.829 m)   Wt 216 lb (98 kg)   SpO2 98%   BMI 29.29 kg/m      General:  Patient is alert and in no apparent distress.  Neck:  Supple with no adenopathy or thyroid abnormality noted.  Cardiovascular:  Regular rate  and rhythm, normal S1/S2, no murmurs, rubs, or gallop.           Additional Information   Social History     Tobacco Use     Smoking status: Current Every Day Smoker     Packs/day: 0.50     Types: Cigarettes     Smokeless tobacco: Never Used     Tobacco comment: 3 per day   Substance Use Topics     Alcohol use: Not Currently     Comment: Rare     Drug use: No            Trina Henriquez MD

## 2021-06-06 NOTE — PROGRESS NOTES
Office Visit   Claudio Chambers   69 y.o. male    Date of Visit: 3/9/2020    Chief Complaint   Patient presents with     Abdominal Pain     Pain LLQ since yesterday        Assessment and Plan   1. Diverticulitis of colon  Symptoms are consistent with diverticulitis.  I think that he can be treated as an outpatient.  I am giving him antibiotics.  If he is not improving or his symptoms recur he will let me know.  He is in agreement with this plan.  - ciprofloxacin HCl (CIPRO) 500 MG tablet; Take 1 tablet (500 mg total) by mouth 2 (two) times a day for 10 days.  Dispense: 20 tablet; Refill: 0  - metroNIDAZOLE (FLAGYL) 500 MG tablet; Take 1 tablet (500 mg total) by mouth 3 (three) times a day for 10 days.  Dispense: 30 tablet; Refill: 0         No follow-ups on file.     History of Present Illness   This 69 y.o. old male comes in with 2 days of left lower quadrant abdominal pain.  He has not had any fevers or chills but has not been feeling as well.  He has not had diarrhea.  He had a similar episode that was diagnosed as diverticulitis about 8 months ago.  He has no other acute concerns today.    Review of Systems: As above, systems otherwise reviewed and negative.     Medications, Allergies and Problem List   Patient Active Problem List   Diagnosis     ICD (implantable cardioverter-defibrillator), dual, in situ     HLD (hyperlipidemia)     Cardiomyopathy, ischemic     Paroxysmal ventricular tachycardia (H)     Left ventricular thrombus following MI (H)     Coronary artery disease due to lipid rich plaque     Diverticul disease small and large intestine, no perforati or abscess     Diverticulitis of colon     Current Outpatient Medications   Medication Sig Dispense Refill     aspirin 81 MG EC tablet Take 81 mg by mouth daily.       atorvastatin (LIPITOR) 40 MG tablet Take 1 tablet (40 mg total) by mouth daily. 90 tablet 1     carvedilol (COREG) 6.25 MG tablet Take 1 tablet (6.25 mg total) by mouth 2 (two) times a day  with meals. 180 tablet 0     COQ10, UBIQUINOL, ORAL Take 2 capsules by mouth daily.       DOCOSAHEXANOIC ACID/EPA (FISH OIL ORAL) Take 4 capsules by mouth daily.       flaxseed Liqd A couple of ounce every morning per pt-- OTC       lisinopriL (PRINIVIL,ZESTRIL) 10 MG tablet Take 1 tablet (10 mg total) by mouth daily. 90 tablet 3     nitroglycerin (NITROSTAT) 0.4 MG SL tablet Place 1 tablet (0.4 mg total) under the tongue as needed. 25 tablet 11     warfarin (COUMADIN/JANTOVEN) 2.5 MG tablet Take 5 mg Monday and Friday and 2.5 mg all other days. 180 tablet 0     ciprofloxacin HCl (CIPRO) 500 MG tablet Take 1 tablet (500 mg total) by mouth 2 (two) times a day for 10 days. 20 tablet 0     metroNIDAZOLE (FLAGYL) 500 MG tablet Take 1 tablet (500 mg total) by mouth 3 (three) times a day for 10 days. 30 tablet 0     No current facility-administered medications for this visit.      No Known Allergies       Physical Exam     /68 (Patient Site: Left Arm, Patient Position: Sitting)   Pulse 71   Ht 6' (1.829 m)   Wt 215 lb (97.5 kg)   SpO2 95%   BMI 29.16 kg/m      General:  Patient is alert and in no apparent distress.  Gastrointestinal:  Abdomen is soft, he has tenderness with deep palpation in the left lower quadrant.  No rebound or guarding is noted.           Additional Information   Social History     Tobacco Use     Smoking status: Current Every Day Smoker     Packs/day: 0.50     Types: Cigarettes     Smokeless tobacco: Never Used     Tobacco comment: 3 per day   Substance Use Topics     Alcohol use: Not Currently     Comment: Rare     Drug use: No            Trina Henriquez MD

## 2021-06-06 NOTE — TELEPHONE ENCOUNTER
ANTICOAGULATION  MANAGEMENT    Assessment     Today's INR result of 3.1 is Supratherapeutic (goal INR of 2.0-3.0)        Warfarin taken as previously instructed    No new diet changes affecting INR    No new medication/supplements affecting INR    Continues to tolerate warfarin with no reported s/s of bleeding or thromboembolism     Previous INR was Supratherapeutic    Plan:     Sent dosing instructions via [employer] per patient's request regarding INR result and instructed:     Warfarin Dosing Instructions:  Change warfarin dose to    5 mg every Mon; 2.5 mg all other days        (11 % change)    Instructed patient to follow up no later than: 2 weeks    Education provided: importance of therapeutic range and target INR goal and significance of current INR result      Instructed to call the [facility] for any changes, questions or concerns. (#[phone])   ?   Zara Kay RN    Subjective/Objective:      Claudio Chambers, a 69 y.o. male is on warfarin.     Claudio reports:     Home warfarin dose: as updated on anticoagulation calendar per template     Missed doses: No     Medication changes:  No     S/S of bleeding or thromboembolism:  Get dizzy easier,( it seems )     New Injury or illness:  Yes:      Changes in diet or alcohol consumption:  No     Upcoming surgery, procedure or cardioversion:  No    Anticoagulation Episode Summary     Current INR goal:   2.0-3.0   TTR:   48.7 % (1 y)   Next INR check:   3/20/2020   INR from last check:   3.10! (3/6/2020)   Weekly max warfarin dose:      Target end date:      INR check location:      Preferred lab:      Send INR reminders to:   [facility]ART CARE    Indications    Left ventricular thrombus following MI (H) [I23.6]  Paroxysmal ventricular tachycardia (H) [I47.2]           Comments:   new INR order 3/5/19         Anticoagulation Care Providers     Provider Role Specialty Phone number    Ramírez Pedersen MD Responsible Internal Medicine  [phone]    [place] Weldon, DO [facility] Cardiology [phone]

## 2021-06-06 NOTE — TELEPHONE ENCOUNTER
New Appointment Needed  What is the reason for the visit:    Same Date/Next Day Appt Request  What is the reason for your visit?: diverticulitis flare up    Provider Preference: Any available  How soon do you need to be seen?: tomorrow- patient insists on being seen at [facility]  Waitlist offered?: No  Okay to leave a detailed message:  Yes

## 2021-06-06 NOTE — TELEPHONE ENCOUNTER
This is an appropriate patient to bring in to the clinic.  Please help him set up a 20 minute appointment.  Thanks.

## 2021-06-06 NOTE — TELEPHONE ENCOUNTER
ANTICOAGULATION  MANAGEMENT    Assessment     Today's INR result of 3.1 is Supratherapeutic (goal INR of 2.0-3.0)        Warfarin taken as previously instructed    No new diet changes affecting INR    No new medication/supplements affecting INR    Continues to tolerate warfarin with no reported s/s of bleeding or thromboembolism     Per mychart states he has had cold like symptoms x 10 days    Previous INR was Therapeutic    Plan:     Gave instructions to Claudio via [employer] (per his request) regarding INR result and instructed:     Warfarin Dosing Instructions:  one time lower dose of 2.5mg tonight then continue current warfarin dose 5 mg daily on Mon/Fri; and 2.5 mg daily rest of week  (0 % change)    Instructed patient to follow up no later than: two weeks    Education provided: importance of therapeutic range and target INR goal and significance of current INR result    Instructed to call the [facility] for any changes, questions or concerns. (#[phone])   ?   Emeli Engle RN    Subjective/Objective:      Claudio Chambers, a 69 y.o. male is on warfarin.     Claudio reports:     Home warfarin dose: as updated on anticoagulation calendar per template     Missed doses: No     Medication changes:  No     S/S of bleeding or thromboembolism:  No     New Injury or illness:  Cold like symptoms for 10 days     Changes in diet or alcohol consumption:  No     Upcoming surgery, procedure or cardioversion:  No    Anticoagulation Episode Summary     Current INR goal:   2.0-3.0   TTR:   50.6 % (1 y)   Next INR check:   3/6/2020   INR from last check:   3.10! (2/21/2020)   Weekly max warfarin dose:      Target end date:      INR check location:      Preferred lab:      Send INR reminders to:   [facility] HEART CARE    Indications    Left ventricular thrombus following MI (H) [I23.6]  Paroxysmal ventricular tachycardia (H) [I47.2]           Comments:   new INR order 3/5/19         Anticoagulation Care  Providers     Provider Role Specialty Phone number    Ramírez Pedersen MD Responsible Internal Medicine [phone]    [place] Weldon DO Responsible Cardiology [phone]

## 2021-06-06 NOTE — PROGRESS NOTES
Office Visit   Claudio Chambers   69 y.o. male    Date of Visit: 3/18/2020    Chief Complaint   Patient presents with     Follow-up     Diverticulitis        Assessment and Plan   1. Diverticulitis of colon  He is scheduled this appointment when his symptoms were not improving.  Over the last 2 days he has now had significant improvement.  He is no longer having any fevers or chills and no longer having abdominal pain.  He has 1 more day on the antibiotics.  This is his third episode in the past year and we therefore will set him up for a colonoscopy.  We will schedule it for a couple months from now.  He may need to consider surgery if he has worrisome diverticuli.  He is in agreement with this plan.  - Ambulatory referral for Colonoscopy    2. Left ventricular thrombus following MI (H)  - INR       No follow-ups on file.     History of Present Illness   This 69 y.o. old comes in to follow-up.  He developed symptoms of abdominal pain 9 days ago and was diagnosed with diverticulitis.  Over the weekend he was not feeling much better.  He was having some fevers.  His pain was not resolved.  He therefore scheduled this appointment.  Today he states that the fevers have resolved and he is no longer having abdominal pain.  Stools are normalizing.  He has 1 more day of antibiotics.  He has had 3 episodes of diverticulitis over the past year.  He is wondering if he should have a colonoscopy.  He has no other acute concerns today.    Review of Systems: As above, systems otherwise reviewed and negative.     Medications, Allergies and Problem List   Patient Active Problem List   Diagnosis     ICD (implantable cardioverter-defibrillator), dual, in situ     HLD (hyperlipidemia)     Cardiomyopathy, ischemic     Paroxysmal ventricular tachycardia (H)     Left ventricular thrombus following MI (H)     Coronary artery disease due to lipid rich plaque     Diverticul disease small and large intestine, no perforati or abscess      Diverticulitis of colon     Ischemic cardiomyopathy     Current Outpatient Medications   Medication Sig Dispense Refill     aspirin 81 MG EC tablet Take 81 mg by mouth daily.       atorvastatin (LIPITOR) 40 MG tablet Take 1 tablet (40 mg total) by mouth daily. 90 tablet 1     carvedilol (COREG) 6.25 MG tablet Take 1 tablet (6.25 mg total) by mouth 2 (two) times a day with meals. 180 tablet 0     ciprofloxacin HCl (CIPRO) 500 MG tablet Take 1 tablet (500 mg total) by mouth 2 (two) times a day for 10 days. 20 tablet 0     COQ10, UBIQUINOL, ORAL Take 2 capsules by mouth daily.       DOCOSAHEXANOIC ACID/EPA (FISH OIL ORAL) Take 4 capsules by mouth daily.       flaxseed Liqd A couple of ounce every morning per pt-- OTC       lisinopriL (PRINIVIL,ZESTRIL) 10 MG tablet Take 1 tablet (10 mg total) by mouth daily. 90 tablet 3     metroNIDAZOLE (FLAGYL) 500 MG tablet Take 1 tablet (500 mg total) by mouth 3 (three) times a day for 10 days. 30 tablet 0     nitroglycerin (NITROSTAT) 0.4 MG SL tablet Place 1 tablet (0.4 mg total) under the tongue as needed. 25 tablet 11     warfarin (COUMADIN/JANTOVEN) 2.5 MG tablet Take 5 mg Monday and Friday and 2.5 mg all other days. 180 tablet 0     No current facility-administered medications for this visit.      No Known Allergies       Physical Exam     /70 (Patient Site: Left Arm, Patient Position: Sitting)   Pulse 92   Ht 6' (1.829 m)   Wt 212 lb (96.2 kg)   SpO2 97%   BMI 28.75 kg/m      General: This is an alert male in no apparent distress.     Additional Information   Social History     Tobacco Use     Smoking status: Current Every Day Smoker     Packs/day: 0.50     Types: Cigarettes     Smokeless tobacco: Never Used     Tobacco comment: 3 per day   Substance Use Topics     Alcohol use: Not Currently     Comment: Rare     Drug use: No            Trina Henriquez MD

## 2021-06-07 NOTE — TELEPHONE ENCOUNTER
ANTICOAGULATION  MANAGEMENT    Assessment     Today's INR result of 2.0 is Therapeutic (goal INR of 2.0-3.0)        Less warfarin taken than instructed which may be affecting INR    No new diet changes affecting INR    No new medication/supplements affecting INR    Continues to tolerate warfarin with no reported s/s of bleeding or thromboembolism     Previous INR was Subtherapeutic    Plan:     Sent message via [employer] per patient's preference regarding INR result and instructed:     Warfarin Dosing Instructions:  continue doses taken    5 mg every Mon; 2.5 mg all other days     (11 % change from previous plan but 0 % from doses taken by patient)    Instructed patient to follow up no later than: 1-2 weeks    Education provided: importance of therapeutic range and target INR goal and significance of current INR result        Instructed to call the [facility] for any changes, questions or concerns. (#[phone])   ?   Zara Kay RN    Subjective/Objective:      Claudio BARKSDALE Reyes, a 69 y.o. male is on warfarin.     Claudio reports:     Home warfarin dose: as updated on anticoagulation calendar per template     Missed doses: No     Medication changes:  No     S/S of bleeding or thromboembolism:  No     New Injury or illness:  No     Changes in diet or alcohol consumption:  No     Upcoming surgery, procedure or cardioversion:  No    Anticoagulation Episode Summary     Current INR goal:   2.0-3.0   TTR:   36.4 % (1 y)   Next INR check:   5/15/2020   INR from last check:   2.00 (5/1/2020)   Weekly max warfarin dose:      Target end date:      INR check location:      Preferred lab:      Send INR reminders to:   [facility] CARE    Indications    Left ventricular thrombus following MI (H) [I23.6]  Paroxysmal ventricular tachycardia (H) [I47.2]           Comments:   new INR order 3/5/19         Anticoagulation Care Providers     Provider Role Specialty Phone number    Ramírez Pedersen MD  Responsible Internal Medicine [phone]    [place] Weldon,  [facility] Cardiology [phone]

## 2021-06-07 NOTE — TELEPHONE ENCOUNTER
ANTICOAGULATION  MANAGEMENT PROGRAM    Claudio Chambers is overdue for INR check.     sent reminder via [employer]      Zara Kay RN

## 2021-06-07 NOTE — TELEPHONE ENCOUNTER
ANTICOAGULATION  MANAGEMENT    Assessment     Today's INR result of 1.6 is Subtherapeutic (goal INR of 2.0-3.0)        Warfarin taken as previously instructed    No new diet changes affecting INR    No new medication/supplements affecting INR    Continues to tolerate warfarin with no reported s/s of bleeding or thromboembolism     Previous INR was low  Therapeutic    Plan:     Sent message via [employer] per patient's preference regarding INR result and instructed:  regarding INR result and instructed:     Warfarin Dosing Instructions:  Change warfarin dose to    5 mg every Tue, Fri; 2.5 mg all other days      (12.5 % change)    Instructed patient to follow up no later than: 1-2 weeks    Education provided: importance of therapeutic range        Instructed to call the [facility] for any changes, questions or concerns. (#[phone])   ?   Zara Kay RN    Subjective/Objective:      Claudio Chambers, a 69 y.o. male is on warfarin.     Claudio reports:     Home warfarin dose: as updated on anticoagulation calendar per template     Missed doses: No     Medication changes:  No     S/S of bleeding or thromboembolism:  No     New Injury or illness:  No     Changes in diet or alcohol consumption:  No     Upcoming surgery, procedure or cardioversion:  No    Anticoagulation Episode Summary     Current INR goal:   2.0-3.0   TTR:   41.0 % (1 y)   Next INR check:   4/28/2020   INR from last check:   1.60! (4/14/2020)   Weekly max warfarin dose:      Target end date:      INR check location:      Preferred lab:      Send INR reminders to:   [facility] CARE    Indications    Left ventricular thrombus following MI (H) [I23.6]  Paroxysmal ventricular tachycardia (H) [I47.2]           Comments:   new INR order 3/5/19         Anticoagulation Care Providers     Provider Role Specialty Phone number    Ramírez Pedersen MD Responsible Internal Medicine [phone]    [place] Weldon DO Responsible  Cardiology [phone]

## 2021-06-08 NOTE — TELEPHONE ENCOUNTER
ANTICOAGULATION  MANAGEMENT    Assessment     Today's INR result of 3.1 is Supratherapeutic (goal INR of 2.0-3.0)        Warfarin taken as previously instructed    No new diet changes affecting INR    No new medication/supplements affecting INR    Continues to tolerate warfarin with no reported s/s of bleeding or thromboembolism     Previous INR was Therapeutic x2    Plan:     Sent message via [employer] per patient's preference regarding INR result and instructed:      Warfarin Dosing Instructions:  Continue current warfarin dose    5 mg every Mon; 2.5 mg all other days      (0 % change)    Instructed patient to follow up no later than: 2-3 weeks.    Education provided: impact of vitamin K foods on INR, importance of therapeutic range and target INR goal and significance of current INR result        Instructed to call the [facility] for any changes, questions or concerns. (#[phone])   ?   Zara Kay RN    Subjective/Objective:      Claudio Chambers, a 70 y.o. male is on warfarin.     Claudio reports:     Home warfarin dose: as updated on anticoagulation calendar per template     Missed doses: No     Medication changes:  No     S/S of bleeding or thromboembolism:  No     New Injury or illness:  No     Changes in diet or alcohol consumption:  No     Upcoming surgery, procedure or cardioversion:  Yes: colonoscopy to be discussed.    Anticoagulation Episode Summary     Current INR goal:   2.0-3.0   TTR:   38.7 % (1 y)   Next INR check:   6/12/2020   INR from last check:   2.60 (5/15/2020)   Most recent INR:    3.10! (6/12/2020)   Weekly max warfarin dose:      Target end date:      INR check location:      Preferred lab:      Send INR reminders to:   [facility]    Indications    Left ventricular thrombus following MI (H) [I23.6]  Paroxysmal ventricular tachycardia (H) [I47.2]           Comments:   new INR order 3/5/19         Anticoagulation Care Providers     Provider Role Specialty Phone  number    Ramírez Pedersen MD Responsible Internal Medicine [phone]    [place]ana Weldon DO Responsible Cardiology [phone]

## 2021-06-08 NOTE — TELEPHONE ENCOUNTER
ANTICOAGULATION  MANAGEMENT    Assessment     Today's INR result of 2.6 is Therapeutic (goal INR of 2.0-3.0)        Warfarin taken as previously instructed    No new diet changes affecting INR    No new medication/supplements affecting INR    Continues to tolerate warfarin with no reported s/s of bleeding or thromboembolism     Previous INR was Therapeutic       Plan:     Sent message via [employer] per patient's preference regarding INR result and instructed:      Warfarin Dosing Instructions:  Continue current warfarin dose    5 mg every Mon; 2.5 mg all other days      (0 % change)    Instructed patient to follow up no later than: 4 weeks - noted that the patient already made appointment for 6/12/2020.    Education provided: importance of therapeutic range        Instructed to call the [facility] for any changes, questions or concerns. (#[phone])   ?   Zara Kay RN    Subjective/Objective:      Claudio Chambers, a 69 y.o. male is on warfarin.     Claudio reports:     Home warfarin dose: as updated on anticoagulation calendar per template     Missed doses: No     Medication changes:  No     S/S of bleeding or thromboembolism:  No     New Injury or illness:  No     Changes in diet or alcohol consumption:  No     Upcoming surgery, procedure or cardioversion:  Colonoscopy TBD    Anticoagulation Episode Summary     Current INR goal:   2.0-3.0   TTR:   36.4 % (1 y)   Next INR check:   6/12/2020   INR from last check:   2.60 (5/15/2020)   Weekly max warfarin dose:      Target end date:      INR check location:      Preferred lab:      Send INR reminders to:   [facility]    Indications    Left ventricular thrombus following MI (H) [I23.6]  Paroxysmal ventricular tachycardia (H) [I47.2]           Comments:   new INR order 3/5/19         Anticoagulation Care Providers     Provider Role Specialty Phone number    Ramírez Pedersen MD [facility] Internal Medicine [phone]    Shiraz  [place]ana Tanner, DO Responsible Cardiology [phone]

## 2021-06-08 NOTE — PROGRESS NOTES
INR 1.8 will increase Warfarin  To 5 mg M,W,F and 2.5 mg all other days. Retest in one month. Sent new script to pharmacy. After talking with pt and discussing history of greens/salads and medication change. Pt will  continue  with current diet and dosing of Warfarin.  Continue with moderation of Vit K and green leafy vegetables. Cautioned to call with increase bruising or bleeding. Reminded to call with medication change especially antibiotic. Call with any questions or concerns or any up coming procedures. Cautioned about using Herbal medication.

## 2021-06-09 NOTE — TELEPHONE ENCOUNTER
ANTICOAGULATION  MANAGEMENT    Assessment     Today's INR result of 2.1 is Therapeutic (goal INR of 2.0-3.0)        Warfarin taken as previously instructed    No new diet changes affecting INR    No new medication/supplements affecting INR    Continues to tolerate warfarin with no reported s/s of bleeding or thromboembolism     Previous INR was slightly Supratherapeutic 3.1 on 6/12/2020    Plan:     Sent message via [employer] per patient's preference regarding INR result and instructed:      Warfarin Dosing Instructions:  Continue current warfarin dose    5 mg every Mon; 2.5 mg all other days     (0 % change)    Instructed patient to follow up no later than: 4 weeks - patient already scheduled for INR check on 8/11/2020    Education provided: importance of therapeutic range        Instructed to call the [facility] for any changes, questions or concerns. (#[phone])   ?   Zara Kay RN    Subjective/Objective:      Claudio Chambers, a 70 y.o. male is on warfarin.     Claudio reports:     Home warfarin dose: as updated on anticoagulation calendar per template     Missed doses: No     Medication changes:  No     S/S of bleeding or thromboembolism:  No     New Injury or illness:  No     Changes in diet or alcohol consumption:  No     Upcoming surgery, procedure or cardioversion:  No    Anticoagulation Episode Summary     Current INR goal:   2.0-3.0   TTR:   41.8 % (1 y)   Next INR check:   8/11/2020   INR from last check:   2.10 (7/14/2020)   Weekly max warfarin dose:      Target end date:      INR check location:      Preferred lab:      Send INR reminders to:   [facility]    Indications    Left ventricular thrombus following MI (H) [I23.6]  Paroxysmal ventricular tachycardia (H) [I47.2]           Comments:   new INR order 3/5/19         Anticoagulation Care Providers     Provider Role Specialty Phone number    Ramírez Pedersen MD [facility] Internal Medicine [phone]    Shiraz  [place] Tanner, DO Responsible Cardiology [phone]

## 2021-06-10 NOTE — TELEPHONE ENCOUNTER
ANTICOAGULATION  MANAGEMENT    Assessment     Today's INR result of 1.9 is Subtherapeutic (goal INR of 2.0-3.0)        Warfarin taken as previously instructed    No new diet changes affecting INR    No new medication/supplements affecting INR    Continues to tolerate warfarin with no reported s/s of bleeding or thromboembolism     Previous INR was Therapeutic    Plan:     [employer] message sent to Claudio regarding INR result and instructed:     Warfarin Dosing Instructions:  Continue current warfarin dose 5 mg daily on Mon; and 2.5 mg daily rest of week      Instructed patient to follow up no later than: 2 weeks    Education provided: target INR goal and significance of current INR result        Instructed to call the [facility] for any changes, questions or concerns. (#[phone])   ?   Jailene Rodríguez RN    Subjective/Objective:      Claudio Chambers, a 70 y.o. male is on warfarin.     Claudio reports:     Home warfarin dose: as updated on anticoagulation calendar per template     Missed doses: No     Medication changes:  No     S/S of bleeding or thromboembolism:  No     New Injury or illness:  No     Changes in diet or alcohol consumption:  No     Upcoming surgery, procedure or cardioversion:  No    Anticoagulation Episode Summary     Current INR goal:   2.0-3.0   TTR:   42.2 % (1 y)   Next INR check:   8/25/2020   INR from last check:   1.90! (8/11/2020)   Weekly max warfarin dose:      Target end date:      INR check location:      Preferred lab:      Send INR reminders to:   [facility]    Indications    Left ventricular thrombus following MI (H) [I23.6]  Paroxysmal ventricular tachycardia (H) [I47.2]           Comments:   new INR order 3/5/19         Anticoagulation Care Providers     Provider Role Specialty Phone number    Ramírez Pedersen MD Responsible Internal Medicine [phone]    [place] Weldon DO Responsible Cardiology [phone]

## 2021-06-10 NOTE — PROGRESS NOTES
INR 3.5. Pt not feeling well. Upset stomach. Will decrease dose Friday and Monday and then back to regular dose and retest in 2 weeks. Increase greens as able. After talking with pt and discussing history of greens/salads and medication change. Pt will  continue  with current diet and dosing of Warfarin.  Continue with moderation of Vit K and green leafy vegetables. Cautioned to call with increase bruising or bleeding. Reminded to call with medication change especially antibiotic. Call with any questions or concerns or any up coming procedures. Cautioned about using Herbal medication.

## 2021-06-10 NOTE — PROGRESS NOTES
INR 3.4 will decrease Warfarin to 5 mg M and Wed and 2.5 mg all other days. Retest in 2 weeks. After talking with pt and discussing history of greens/salads and medication change. Pt will  continue  with current diet and dosing of Warfarin.  Continue with moderation of Vit K and green leafy vegetables. Cautioned to call with increase bruising or bleeding. Reminded to call with medication change especially antibiotic. Call with any questions or concerns or any up coming procedures. Cautioned about using Herbal medication.

## 2021-06-11 NOTE — TELEPHONE ENCOUNTER
ANTICOAGULATION  MANAGEMENT PROGRAM    Claudio Chambers is overdue for INR check.     Sent a [employer] message for the patient to call  to schedule an INR appointment.      Zara Kay RN

## 2021-06-11 NOTE — PROGRESS NOTES
[facility] Daily Progress     Patient Name: Claudio Chambers  Date: 7/17/2017  Visit #: 4  PTA visit #:  -  Referral Diagnosis:   Chronic right-sided low back pain without sciatica  Referring provider: Cara Shine C*  Visit Diagnosis:     ICD-10-CM    1. Chronic bilateral low back pain with bilateral sciatica M54.42     M54.41     G89.29    2. Generalized muscle weakness M62.81    3. Abnormal posture R29.3         Claudio Chambers is a 67 y.o. male who presents to therapy today with chief complaints of B LBP with radiating pain and numbness into B LEs. Pain has been ongoing for about 10-12 years without a known injury. Recent imaging showing unstable spondylolisthesis at L5-S1 and left L5 pars defect per provider report. Pt is very reluctant with any movement or activity. He has had PT in the past with minimal changes. Pt presents with limited lumbar ROM due to pain and fear of increasing pain, but functional LE strength. Numbness of LEs begins with prolonged walking and mainly at lateral and anterior aspects of the leg to the knee. Pt has difficulty self-managing pain with lying supine on lazy boy only providing relief. Pt reported h/o ICD, congestive heart failure, and CAD.  Pain symptoms are getting worse.  Functional impairments include transfers, bed mobility, bending, lifting, meal preep, dressing, bathing, ADLs, carrying items, and sleeping.  Pt demo's signs and sx consistent with spondylolisthesis.     Assessment:     HEP/POC compliance is  good .     Pt does have slight increase in pain due to some flares while moving in bed. He has the some discomfort with going from inactivity to activity. Discussed some light pelvic tilting prior to going to standing up. Pt tolerating MedX well without any increases in pain. Pt continues to perform MedX DE with use of LEs which may be limiting lumbar strengthening. Pt also showing good technique with HEP and core exercises, but core weakness  evident. Pt is appropriate to continue with PT for further core strengthening and stabilization. Pt seems to be more motivated and understanding of the benefits of exercise and how may help his back pain.    Goal Status:  Pt will: be independent with HEP within 3 weeks.  Pt will: be able to walk for 10 minutes or more without pain within 6 weeks.  Pt will: be able to perform bed mobility with 2/10 pain or less within 6 weeks.  Pt will: demonstrate improved lumbar ROM in order to play golf within 8 weeks.    Plan / Patient Education:     Continue with initial plan of care.     Plan for next visit: review HEP, MEDX DE, rotary torso, passive/active nerve glides, core progression (review deadbug marching, SLRs, clamshells)    Subjective:     Pain Rating: stiffness    Back is about the same if not worse just a little bit due to a couple of episodes.     Objective:     MED X Testing Lumbar Initial 7/10/17 4 week re-test -   AROM (full ROM 0-72) 0-39    Max Torque  367#    Avg Torque  329#    Flex/ext Ratio (ideal 1.4:1) 1.25:1      No pain increases during session    Able to progress TA strengthening well.    Treatment Today     TREATMENT MINUTES COMMENTS   Evaluation     Self-care/ Home management     Manual therapy     Neuromuscular Re-education     Therapeutic Activity     Therapeutic Exercises 28 See exercise and MedX flowsheets. Added TA March and bridge.   Gait training     Modality__________________                Total 28    Blank areas are intentional and mean the treatment did not include these items.       Man FISH, PT, DPT  7/17/2017

## 2021-06-11 NOTE — PROGRESS NOTES
F/U  --CT lumbar spine 9/10/15  --9/22/15 left L3-L4 TFESI with Dr. Hernandez, good relief   --Today C/O right low back pain that flared up x 1 month with tingling in the right posterior thigh, worsening x 1 week  --Very stiff and feels weak in back per pt  --PT in the past but nothing recent  --Pain is worse in the morning    Medication  --No pain meds

## 2021-06-11 NOTE — PROGRESS NOTES
[facility] Daily Progress     Patient Name: Claudio Chambers  Date: 7/13/2017  Visit #: 3  PTA visit #:  -  Referral Diagnosis:   Chronic right-sided low back pain without sciatica  Referring provider: Cara Shine C*  Visit Diagnosis:     ICD-10-CM    1. Chronic bilateral low back pain with bilateral sciatica M54.42     M54.41     G89.29    2. Generalized muscle weakness M62.81    3. Abnormal posture R29.3         Claudio Chambers is a 67 y.o. male who presents to therapy today with chief complaints of B LBP with radiating pain and numbness into B LEs. Pain has been ongoing for about 10-12 years without a known injury. Recent imaging showing unstable spondylolisthesis at L5-S1 and left L5 pars defect per provider report. Pt is very reluctant with any movement or activity. He has had PT in the past with minimal changes. Pt presents with limited lumbar ROM due to pain and fear of increasing pain, but functional LE strength. Numbness of LEs begins with prolonged walking and mainly at lateral and anterior aspects of the leg to the knee. Pt has difficulty self-managing pain with lying supine on lazy boy only providing relief. Pt reported h/o ICD, congestive heart failure, and CAD.  Pain symptoms are getting worse.  Functional impairments include transfers, bed mobility, bending, lifting, meal preep, dressing, bathing, ADLs, carrying items, and sleeping.  Pt demo's signs and sx consistent with spondylolisthesis.     Assessment:     HEP/POC compliance is  good .     Pt tolerating MedX well without any increases in pain. Pt continues to perform MedX DE with use of LEs which may be limiting lumbar strengthening. Pt also showing good technique with HEP and core exercises, but core weakness evident. Pt is appropriate to continue with PT for further core strengthening and stabilization. Pt seems to be more motivated and understanding of the benefits of exercise and how may help his back pain.    Goal  "Status:  Pt will: be independent with HEP within 3 weeks.  Pt will: be able to walk for 10 minutes or more without pain within 6 weeks.  Pt will: be able to perform bed mobility with 2/10 pain or less within 6 weeks.  Pt will: demonstrate improved lumbar ROM in order to play golf within 8 weeks.    Plan / Patient Education:     Continue with initial plan of care.     Plan for next visit: review HEP, MEDX DE, rotary torso, passive/active nerve glides, core progression (review bridge, trial UUDD, SLRs, clamshells)    Subjective:     Pain Rating: stiffness    Pt did not notice much soreness after using the machines last session. Pt says that he felt better this morning than usual, but having \"normal pain.\" He has been using his back brace if he knows that he is going on longer walks.    Objective:     MED X Testing Lumbar Initial 7/10/17 4 week re-test -   AROM (full ROM 0-72) 0-39    Max Torque  367#    Avg Torque  329#    Flex/ext Ratio (ideal 1.4:1) 1.25:1      Continued use of LEs with lumbar MedX DE    No pain increases during session    Educated on holds with TA contractions    Treatment Today     TREATMENT MINUTES COMMENTS   Evaluation     Self-care/ Home management     Manual therapy     Neuromuscular Re-education     Therapeutic Activity     Therapeutic Exercises 24 See exercise and MedX flowsheets. Added TA March and bridge.   Gait training     Modality__________________                Total 24    Blank areas are intentional and mean the treatment did not include these items.       Jose Bardales, PT, DPT  7/13/2017    "

## 2021-06-11 NOTE — TELEPHONE ENCOUNTER
Dr. Henriquez,    It is okay to discharge patient from [facility]? ( please see patient's mychart message )    Please advise.    Zara Green RN

## 2021-06-11 NOTE — PROGRESS NOTES
Assessment:     Diagnoses and all orders for this visit:    Chronic right-sided low back pain without sciatica  -     Ambulatory referral to PT/OT    Pars defect of lumbar spine  -     Ambulatory referral to PT/OT    Lumbar radiculopathy, chronic  -     Ambulatory referral to PT/OT    Anticoagulant long-term use    Spondylolisthesis, lumbar region  -     Ambulatory referral to PT/OT       Claudio Chambers is a 67 y.o. y.o. male with past medical history significant for coronary artery disease, hyperlipidemia, implantable cardioverter defibrillator, chronic systolic congestive heart failure, compulsive tobacco user, ischemic cardiomyopathy who presents today for follow-up regarding ongoing chronic low back pain is worse on the right with nonspecific radiculopathy.    Patient is neurologically intact on exam.     Plan:     A shared decision making plan was used. The patient's values and choices were respected. Prior medical records from 6/15/17 were reviewed today. The following represents what was discussed and decided upon by the provider and the patient.        -DIAGNOSTIC TESTS: Images were personally reviewed and interpreted.   --Updated lumbar spine CT scan does still reveal chronic bilateral pars defect with 2 mm movement flexion-extension however very minimal instability.  Does also have L4-5 right paracentral disc osteophyte complex which is similar to prior imaging.  Mild to moderate stable right neural foraminal stenosis at L4-5 and stable moderate right neural foraminal stenosis at L5-S1.    -INTERVENTIONS: Could consider a right L4-5 transforaminal epidural steroid injection, versus L5-S1, if symptoms are not improving in the future.    -MEDICATIONS: Did discuss possibility of gabapentin for radiculopathy/numbness and tingling however patient wants to hold off at this time.  -Not interested in further medications at this time.  Discussed side effects of medications and proper use. Patient verbalized  "understanding.    -PHYSICAL THERAPY: Referral to physical therapy [facility] for lumbar MedX protocol for core strengthening.  He does have mild instability at 2 mm L5-S1 spondylolisthesis, however okay to move forward with MedX program, this was also discussed and verified with Dr. Costa.  Discussed the importance of core strengthening, ROM, stretching exercises with the patient and how each of these entities is important in decreasing pain.  Explained to the patient that the purpose of physical therapy is to teach the patient a home exercise program.  These exercises need to be performed every day in order to decrease pain and prevent future occurrences of pain.        -PATIENT EDUCATION:  25 minutes of total visit time was spent face to face with the patient today, 60 % of the visit was spent on counseling, education, and coordinating care.     -FOLLOW UP: Follow-up 2 months if symptoms are not improving, sooner if symptoms are worsening at any time  Advised to contact clinic if symptoms worsen or change.    Subjective:     Claudio Chambers is a 67 y.o. male who presents today for follow-up regarding ongoing chronic bilateral low back pain that changes patterns however today is right-sided low back pain L4 to the sacrum in which he feels he has a \"collapsing\" type feeling in his right low back, unable to really describe his pain or give a number to his pain today, it is more of a soreness/spasm/tightness.    He does report numbness and tingling in bilateral lateral and anterior thighs that stops at the knee however this is not as bothersome as his back \"pain\".    She denies radicular pain, denies numbness or tingling, denies weakness, denies recent trips or falls.    Treatment to Date: Physical therapy in 2015, patient does continue home exercise stretches.     Left L3-4 TF YAZMIN 9/22/2015 Dr. Hernadnez with significant relief.     Medications: Not currently taking medications for pain.    Patient " Active Problem List   Diagnosis     ICD (implantable cardioverter-defibrillator), dual, in situ     Arteriosclerosis of coronary artery     Family history of coronary arteriosclerosis     HDL deficiency     HLD (hyperlipidemia)     Cannot sleep     Cardiomyopathy, ischemic     LBP (low back pain)     Paroxysmal ventricular tachycardia     ST elevation MI (STEMI)     Compulsive tobacco user syndrome     Ischemic cardiomyopathy     Left ventricular thrombus following MI     Chronic systolic CHF (congestive heart failure), NYHA class 2     Coronary artery disease due to lipid rich plaque       Current Outpatient Prescriptions on File Prior to Encounter   Medication Sig Dispense Refill     aspirin 81 MG EC tablet Take 81 mg by mouth daily.       atorvastatin (LIPITOR) 40 MG tablet Take 1 tablet (40 mg total) by mouth daily. 90 tablet 3     carvedilol (COREG) 6.25 MG tablet Take 1 tablet (6.25 mg total) by mouth 2 (two) times a day. 180 tablet 0     lisinopril (PRINIVIL,ZESTRIL) 10 MG tablet Take 1 tablet (10 mg total) by mouth daily. 90 tablet 3     nitroglycerin (NITROSTAT) 0.4 MG SL tablet Place 1 tablet (0.4 mg total) under the tongue as needed. 25 tablet 3     warfarin (COUMADIN) 2.5 MG tablet Take 5 mg daily 180 tablet 2     No current facility-administered medications on file prior to encounter.        No Known Allergies    Past Medical History:   Diagnosis Date     CAD (coronary artery disease), native coronary artery 2013    pLAD: 2.75 x 20 mm PROMUS Element stent, p RCA: 3.0 x 30 mm Resolute drug-eluting stent, [facility]     HLD (hyperlipidemia)      ICD (implantable cardioverter-defibrillator) in place     AdWired.  Placed for inducible VT, LVEF:40%, ischemic     Ischemic cardiomyopathy LVEF 40%. Apical akinesis     LV (left ventricular) mural thrombus following MI     On warfarin     Myocardial infarction         Review of Systems  ROS: Positive for numbness and tingling.  Specifically negative for  bowel/bladder dysfunction, balance changes, headache, dizziness, foot drop, fevers, chills, appetite changes, nausea/vomiting, unexplained weight loss. Otherwise 13 systems reviewed are negative. Please see the patient's intake questionnaire from today for details.    Reviewed Social, Family, Past Medical and Past Surgical history with patient, no significant changes noted since prior visit.     Objective:     /72 (Patient Site: Left Arm, Patient Position: Sitting)  Pulse 83  Wt (!) 224 lb (101.6 kg)  SpO2 93%  BMI 30.38 kg/m2    PHYSICAL EXAMINATION:    --CONSTITUTIONAL: Well developed, well nourished, healthy appearing individual.  --PSYCHIATRIC: Appropriate mood and affect. No difficulty interacting due to temper, social withdrawal, or memory issues.  --SKIN: Lumbar region is dry and intact. Sensation to light touch is intact in the bilateral L4, L5, and S1 dermatomes.  --RESPIRATORY: Normal rhythm and effort. No abnormal accessory muscle breathing patterns noted.   --MUSCULOSKELETAL:  Normal lumbar lordosis noted, no lateral shift.  --GROSS MOTOR: Easily arises from a seated position.   --LUMBAR SPINE:  Inspection reveals no evidence of deformity. Range of motion is not limited in lumbar flexion, extension, or lateral rotation. No tenderness to palpation. Straight leg raising in the supine position is negative to radicular pain. Sciatic notch non-tender.   --LOWER EXTREMITY MOTOR TESTING:  Plantar flexion left 5/5, right 5/5   Dorsiflexion left 5/5, right 5/5   Great toe MTP extension left 5/5, right 5/5  Knee flexion left 5/5, right 5/5  Knee extension left 5/5, right 5/5   Hip flexion left 5/5, right 5/5  Hip abduction left 5/5, right 5/5  Hip adduction left 5/5, right 5/5   --HIPS: Full range of motion bilaterally. Negative FABERs on the involved lower extremity.   --NEUROLOGIC: Bilateral patellar and achilles reflexes are physiologic and symmetric. Lower extremities are intact to light touch.      RESULTS:   Imaging: MRI of the lumbar spine was reviewed today. The images were shown to the patient and the findings were explained using a spine model.      Ct Lumbar Spine Without Contrast  Result Date: 6/18/2017  [facility] CT LUMBAR SPINE WO CONTRAST 6/16/2017 11:33 AM INDICATION: Low back pain, >6 weeks / red flag(s) / radiculopathy. TECHNIQUE: Helical images were obtained through the lumbar spine and were evaluated in the axial plane with sagittal and coronal reformations. Dose reduction techniques were used. COMPARISON: Prior CT lumbar spine of 9/10/2015. FINDINGS: Nomenclature is based on 5 lumbar type vertebral bodies. Alignment is normal. Vertebral body heights are maintained. No acute fracture or posttraumatic subluxation. Chronic nondisplaced left L5 pars interarticularis defect. This is stable compared to 2015. Sclerosis about the right L5 pars may represent previous healed defect though is grossly unchanged compared to the 2015 study. No significant spondylolisthesis. No other pars defect.     Grossly normal paraspinal soft tissues. Normal aortic diameter. Degenerative change about the sacroiliac joints. The visualized bony pelvis otherwise grossly normal. T12-L1: Normal disc height. No herniation. Mild to moderate left and mild right relatively stable facet arthropathy. No spinal canal stenosis. No right neural foraminal stenosis. No left neural foraminal stenosis. L1-L2: Normal disc height. No herniation. Mild left facet arthropathy. No spinal canal stenosis. No right neural foraminal stenosis. No left neural foraminal stenosis. L2-L3: Normal disc height. Mild broad-based disc bulging eccentric to the left. No herniation. No facet arthropathy. No spinal canal stenosis. No right neural foraminal stenosis. No left neural foraminal stenosis. L3-L4: Normal disc height. Mild disc bulging eccentric to the left. Mild left lateral recess narrowing similar to the prior study. No herniation. No  facet arthropathy. No spinal canal stenosis. No right neural foraminal stenosis. No left neural foraminal  stenosis. L4-L5: Mild loss of disc height. Mild disc bulging eccentric to the right with small right paracentral disc osteophyte complex similar to the prior study. Moderate right and mild left facet arthropathy. Mild spinal canal stenosis. Moderate right lateral recess narrowing similar to 2015 to slightly progressive. Mild to moderate stable right neural foraminal stenosis. No left neural foraminal stenosis. L5-S1: Mild loss of disc height. No herniation. Marked right and moderate left facet arthropathy. No spinal canal stenosis. Stable moderate right neural foraminal stenosis. No left neural foraminal stenosis.   CONCLUSION:   1.  Relatively stable lumbar spondylosis without high-grade canal or foraminal narrowing.   2.  Moderate right foraminal narrowing L5-S1 is stable.   3.  Chronic left L5 pars interarticularis defect. Sclerosis about the right L5 pars may represent a healed defect and is unchanged. No spondylolisthesis.   4.  Right lateral recess narrowing L3-L4 is stable.   5.  Severe right-sided facet arthropathy L5-S1 and lower grade changes elsewhere.       Xr Lumbar Spine Flex And Ext 2 Or 3 Vws  Result Date: 6/20/2017  INDICATION: Chronic right-sided low back pain without sciatica    COMPARISON: CT lumbar spine 6/16/2017 FINDINGS: 5 lumbar type vertebra. No radiographic evidence for fracture or significant subluxation in neutral position. Minor dynamic subluxation measuring no more than 2 mm at L5-S1 between flexion and extension positioning. No frankly pathologic motion  identified. The patient's known left L5 pars defect is better appreciated on CT. Mild loss of disc height at L5-S1. Mild to moderate facet arthropathy at L5-S1.      Lumbar CT scan 9/10/2015  Conclusion:  1.  There is chronic appearing left pars interarticularis fracture at L5.  Sclerotic changes of the right pars  interarticularis and inferior articulating facet of L5 is presumably secondary to advanc degenerative facet arthropathy on the right at L5-S1.  Alternatively, sclerosis involving the pars interarticularis may be associated with healed pars fracture.  2.  No acute lumbar spine fracture.  3.  At L4-5, there is mild circumferential bulge with ligamentum flavum hypertrophy and mild spinal canal stenosis.  Mild neural foraminal narrowing on the right at this level.  4.  Diffuse osteopenia.

## 2021-06-11 NOTE — PROGRESS NOTES
F/U  --To review MRI lumbar spine and xray 6/16/17  --C/O stiffness in the midlow back pain, worsening   --Numbness B/L anterior thigh  --Patient states of ongoing right sided hip, feels like it's going to collapse per pt x ongoing x 5- years   --Can't rate his pain for me but stated it's no change from last time  --Wears a back brace with walking and activities which helps per pt    Medication  --No pain meds

## 2021-06-11 NOTE — PROGRESS NOTES
[facility]   Lumbo-Pelvic Initial Evaluation    Patient Name: Claudio Chambers  Date of evaluation: 7/6/2017  Referral Diagnosis: Chronic right-sided low back pain without sciatica  Referring provider: Cara Shine C*  Visit Diagnosis:     ICD-10-CM    1. Chronic bilateral low back pain with bilateral sciatica M54.42     M54.41     G89.29    2. Generalized muscle weakness M62.81    3. Abnormal posture R29.3        Assessment:     Claudio Chambers is a 67 y.o. male who presents to therapy today with chief complaints of B LBP with radiating pain and numbness into B LEs. Pain has been ongoing for about 10-12 years without a known injury. Recent imaging showing unstable spondylolisthesis at L5-S1 and left L5 pars defect per provider report. Pt is very reluctant with any movement or activity. He has had PT in the past with minimal changes. Pt presents with limited lumbar ROM due to pain and fear of increasing pain, but functional LE strength. Numbness of LEs begins with prolonged walking and mainly at lateral and anterior aspects of the leg to the knee. Pt has difficulty self-managing pain with lying supine on lazy boy only providing relief. Pt reported h/o ICD, congestive heart failure, and CAD.  Pain symptoms are getting worse.  Functional impairments include transfers, bed mobility, bending, lifting, meal preep, dressing, bathing, ADLs, carrying items, and sleeping.  Pt demo's signs and sx consistent with spondylolisthesis.     Impairments in  pain, posture, ROM, joint mobility, strength, sensory function, ADL's  The POC is dynamic and will be modified on an ongoing basis.  Patient will return to clinic if symptoms persist.  Barriers to achieving goals as noted in the assessment section may affect outcome.  Prognosis to achieve goals is  good   Skilled PT is required to improve mobility, ROM, flexibility, posture, ADL function, strength and stability, and reduce pain.  Pt. is appropriate for  skilled PT intervention as outlined in the Plan of Care (POC).  Pt. is a good candidate for skilled PT services to improve pain levels and function.    Goals:  Pt will: be independent with HEP within 3 weeks.  Pt will: be able to walk for 10 minutes or more without pain within 6 weeks.  Pt will: be able to perform bed mobility with 2/10 pain or less within 6 weeks.  Pt will: demonstrate improved lumbar ROM in order to play golf within 8 weeks.    Patient's expectations/goals are realistic.    Barriers to Learning or Achieving Goals:  No Barriers.       Plan / Patient Instructions:        Plan of Care:   Communication with: Referral Source  Patient Related Instruction: Nature of Condition;Body mechanics;Posture;Treatment plan and rationale;Precautions;Next steps;Self Care instruction;Expected outcome;Basis of treatment  Times per Week: 1-2  Number of Weeks: 8-12  Number of Visits: 14-16  Discharge Planning: Pt will be discharged when all goals are met, lack of progress or worsening condition, MD referral, and/or pt desires to continue with HEP independently.  Precautions / Restrictions : ICD, spondylolithesis  Therapeutic Exercise: ROM;Stretching;Strengthening  Neuromuscular Reeducation: kinesio tape;posture;core;TNE  Manual Therapy: soft tissue mobilization;myofascial release;joint mobilization;muscle energy;strain counterstrain  Modalities: electrical stimulation;TENS;iontophoresis;ultrasound;cold pack;hot pack  Functional Training (ADL's): self care;meal prep;ADL's;instructions for equipment;ergonomics;instructions in transfers;safety procedures  Equipment: theraband;TENS unit    Plan for next visit: review HEP, possible testing of MedX DE, rotary torso, passive/active nerve glides, core progression     Subjective:   Social information:   Occupation:    Work Status:Working full time   Equipment Available: None    History of Present Illness:    Claudio is a 67 y.o. male who presents to therapy  "today with complaints of B LBP that has been ongoing for about 10-12 years ago. Pt also having pain into both legs. Pt does not recall an injury when the pain initially started. Pt has regular muscle spasms throughout the day that are also very bothersome. He has noticed worsening pain over last few years, but \"worse pain now more than ever.\" Symptoms are getting worse. He reports  an episodic  history of similar symptoms. He describes their previous level of function as not limited. Pt says \"I can't do anything today.\"     Pain Ratin  Pain rating at best: 0  Pain rating at worst: 10  Pain description: numbness, sharp, shooting and tingling    Functional limitations are described as occurring with:   bending  lifting  personal cares dressing lower body, donning shoes and socks, donning shirt or jacket, washing hair and washing body  reaching overhead  performing routine daily activities  sitting : prolonged  sleeping  standing : prolonged  transitional movements getting in  bed and chair, getting out of  bed and chair, sit to stand and sit to supine  twisting or turning trunk  walking : prolonged    Patient reports benefit from:  laying on EZ chair         Objective:      Note: Items left blank indicates the item was not performed or not indicated at the time of the evaluation.    Examination  1. Chronic bilateral low back pain with bilateral sciatica     2. Generalized muscle weakness     3. Abnormal posture       Involved side: Bilateral  Posture Observation:      General sitting posture is  fair.  General standing posture is fair.    Lumbar ROM:    Date: 17     *Indicate scale AROM AROM AROM   Lumbar Flexion WFL, slow     Lumbar Extension Mod restriction      Right Left Right Left Right Left   Lumbar Sidebending Mod restriction Mod restriction       Lumbar Rotation Mod restriction Mod restriction       Thoracic Flexion      Thoracic Extension      Thoracic Sidebending         Thoracic Rotation       "     Lower Extremity Strength:     Date: 7/6/17     LE strength/5 Right Left Right Left Right Left   Hip Flexion (L1-3) 5 5       Hip Extension (L5-S1)         Hip Abduction (L4-5) 5 5       Hip Adduction (L2-3) 5 5       Hip External Rotation         Hip Internal Rotation         Knee Extension (L3-4) 5 5       Knee Flexion 5 5       Ankle Dorsiflexion (L4-5) 5 5       Great Toe Extension (L5)         Ankle Plantar flexion (S1)         Abdominals        Sensation: occasional numbness into B LEs to the knees (lateral and front)    Palpation: mild tenderness throughout lumbar paraspinals    Lumbar Special Tests:     Lumbar Special Tests Right Left SI Tests Right  Left   Quadrant test   SI Compression     Straight leg raise - + 50 deg SI Distraction     Crossover response  + POSH Test     Slump - - Sacral Thrust     Sit-up test  FADIR     Trunk extensor endurance test  Resisted Abduction     Prone instability test  Other:     Pubic shotgun  Other:       Repeated Motion Testing:  Not tested    Passive Mobility - Joint Integrity:  Not tested, unable to assess due to pain and willingness    LE Screen:  B hamstring tightness    Treatment Today     TREATMENT MINUTES COMMENTS   Evaluation 25    Self-care/ Home management     Manual therapy     Neuromuscular Re-education     Therapeutic Activity     Therapeutic Exercises 25 See exercises for HEP. Pt educated on MedX and future POC.   Gait training     Modality__________________                Total 50    Blank areas are intentional and mean the treatment did not include these items.       PT Evaluation Code: (Please list factors)  Patient History/Comorbidities: CD, congestive heart failure, CAD, chronic LBP   Examination: B low back pain with sciatica, poor posture, core weakness  Clinical Presentation: stable  Clinical Decision Making: low    Patient History/  Comorbidities Examination  (body structures and functions, activity limitations, and/or participation restrictions)  Clinical Presentation Clinical Decision Making (Complexity)   No documented Comorbidities or personal factors 1-2 Elements Stable and/or uncomplicated Low   1-2 documented comorbidities or personal factor 3 Elements Evolving clinical presentation with changing characteristics Moderate   3-4 documented comorbidities or personal factors 4 or more Unstable and unpredictable High          Jose Bardales, PT, DPT  7/6/2017  12:01 PM

## 2021-06-11 NOTE — PROGRESS NOTES
ASSESSMENT: Claudio Chambers is a 67 y.o. male who presents for consultation at the request of [facility] PCP Ramírez Pedersen MD, with a past medical history significant for coronary artery disease, hyperlipidemia, implantable cardioverter defibrillator, chronic systolic congestive heart failure, compulsive tobacco user, ischemic cardiomyopathy who presents today for new patient evaluation of chronic bilateral low back pain that changes patterns today right-sided low back pain is more bothersome L4 to the sacrum.    Agent is neurologically intact on exam.    GEORGE Score: 34    WHO-5: 11    PHQ-2: 3    Diagnoses and all orders for this visit:    Chronic right-sided low back pain without sciatica  -     CT Lumbar Spine Without Contrast; Future; Expected date: 6/15/17  -     XR Lumbar Spine Flex and Ext 2 or 3 VWS; Future; Expected date: 6/15/17    Lumbar radiculopathy, chronic  -     CT Lumbar Spine Without Contrast; Future; Expected date: 6/15/17  -     XR Lumbar Spine Flex and Ext 2 or 3 VWS; Future; Expected date: 6/15/17    Pars defect of lumbar spine  -     CT Lumbar Spine Without Contrast; Future; Expected date: 6/15/17  -     XR Lumbar Spine Flex and Ext 2 or 3 VWS; Future; Expected date: 6/15/17       PLAN:  Reviewed spine anatomy and disease process. Discussed diagnosis and treatment options with the patient today. A shared decision making model was used.  The patient's values and choices were respected. The following represents what was discussed and decided upon by the provider and the patient.      -DIAGNOSTIC TESTS: Reviewed lumbar spine CT scan patient and did order updated lumbar CT scan and lumbar flexion-extension x-ray evaluate pars defect stability today.    -PHYSICAL THERAPY: Discussed the possibility of physical therapy pending MRI review.        -MEDICATIONS: Patient is not interested in medications today.    -INTERVENTIONS: Discussed the possibility of injections, weakly would not be candidate for  medial branch block as his pain does change positions on a regular basis throughout the day.      -PATIENT EDUCATION:  45 minutes of total visit time was spent face to face with the patient today, 60% of the visit was spent on counseling, education, and coordinating care.     -FOLLOW-UP:   Follow-up after obtaining lumbar CT scan to review images and ongoing plan.    Advised pt to call the [facility] if symptoms worsen or you have problems controlling bladder and bowel function.   ______________________________________________________________________    SUBJECTIVE:  HPI:  Claudio Chambers  Is a 67 y.o. male who presents today for new patient evaluation of chronic bilateral low back pain that changes patterns today right-sided low back pain is more bothersome L4 to the sacrum.  In general his pain is worse with bending, transitioning from sitting to stand as well as walking in which he can walk no more than 1 block.  Pain is described as more as soreness/spasm/tightness rather than pain, unable to give us a number today.    Treatment to Date: Physical therapy in 2015, patient does continue home exercise stretches.    Left L3-4 TF YAZMIN 9/22/2015 Dr. Hernandez with significant relief.    Medications: Not currently taking medications for pain.    Current Outpatient Prescriptions on File Prior to Encounter   Medication Sig Dispense Refill     aspirin 81 MG EC tablet Take 81 mg by mouth daily.       atorvastatin (LIPITOR) 40 MG tablet Take 1 tablet (40 mg total) by mouth daily. 90 tablet 3     carvedilol (COREG) 6.25 MG tablet Take 1 tablet (6.25 mg total) by mouth 2 (two) times a day. 180 tablet 0     lisinopril (PRINIVIL,ZESTRIL) 10 MG tablet Take 1 tablet (10 mg total) by mouth daily. 90 tablet 3     nitroglycerin (NITROSTAT) 0.4 MG SL tablet Place 1 tablet (0.4 mg total) under the tongue as needed. 25 tablet 3     warfarin (COUMADIN) 2.5 MG tablet Take 5 mg daily 180 tablet 2     [DISCONTINUED] ondansetron (ZOFRAN, AS  HYDROCHLORIDE,) 4 MG tablet Take 1-2 tab po 4 times daily prn nausea, max daily dose 4 tab 20 tablet 0     No current facility-administered medications on file prior to encounter.        No Known Allergies    Past Medical History:   Diagnosis Date     CAD (coronary artery disease), native coronary artery 2013    pLAD: 2.75 x 20 mm PROMUS Element stent, p RCA: 3.0 x 30 mm Resolute drug-eluting stent, [facility]     HLD (hyperlipidemia)      ICD (implantable cardioverter-defibrillator) in place     [employer]  Placed for inducible VT, LVEF:40%, ischemic     Ischemic cardiomyopathy LVEF 40%. Apical akinesis     LV (left ventricular) mural thrombus following MI     On warfarin     Myocardial infarction         Patient Active Problem List   Diagnosis     ICD (implantable cardioverter-defibrillator), dual, in situ     Arteriosclerosis of coronary artery     Family history of coronary arteriosclerosis     HDL deficiency     HLD (hyperlipidemia)     Cannot sleep     Cardiomyopathy, ischemic     LBP (low back pain)     Paroxysmal ventricular tachycardia     ST elevation MI (STEMI)     Compulsive tobacco user syndrome     Ischemic cardiomyopathy     Left ventricular thrombus following MI     Chronic systolic CHF (congestive heart failure), NYHA class 2     Coronary artery disease due to lipid rich plaque       Past Surgical History:   Procedure Laterality Date     CARDIAC CATHETERIZATION       CARDIAC PACEMAKER PLACEMENT      [employer] dual ICD     CAROTID STENT      pLAD: 2.75 x 20 mm PROMUS Element stent, p RCA: 3.0 x 30 mm Resolute drug-eluting stent, [facility]     CORONARY STENT PLACEMENT      pLAD: 2.75 x 20 mm PROMUS Element stent, p RCA: 3.0 x 30 mm Resolute drug-eluting stent, [facility]       Family History   Problem Relation Age of Onset     Coronary artery disease Mother      Hypertension Mother      Diabetes Mother      Coronary artery disease Father      Cancer Sister      Lupus Niece       Fibromyalgia Niece        SOCIAL HX: Patient denies smoking, alcohol, illicit drug use.    ROS: Positive for back pain, joint pain, shortness of breath, anxiety.  Specifically negative for bowel/bladder dysfunction, balance changes, headache, dizziness, foot drop, fevers, chills, appetite changes, nausea/vomiting, unexplained weight loss. Otherwise 13 systems reviewed are negative. Please see the patient's intake questionnaire from today for details.    OBJECTIVE:  /57 (Patient Site: Left Arm, Patient Position: Sitting)  Pulse 73  Wt 221 lb (100.2 kg)  BMI 29.97 kg/m2    PHYSICAL EXAMINATION:    --CONSTITUTIONAL:  Vital signs as above.  No acute distress.  The patient is well nourished and well groomed.  --PSYCHIATRIC:  Appropriate mood and affect. The patient is awake, alert, oriented to person, place, time and answering questions appropriately with clear speech.    --SKIN:  Skin over the face, bilateral lower extremities, and posterior torso is clean, dry, intact without rashes.    --RESPIRATORY: Normal rhythm and effort. No abnormal accessory muscle breathing patterns noted.   --ABDOMINAL:  Soft, non-distended, non-tender throughout all quadrants.  No pulsatile mass palpated in the left lower quadrant.  --STANDING EXAMINATION:  Normal lumbar lordosis noted, no lateral shift.  --MUSCULOSKELETAL: Lumbar spine inspection reveals no evidence of deformity. Range of motion is slightly limited in lumbar flexion and extension. No tenderness to palpation. Straight leg raising in the supine position is negative to radicular pain. Sciatic notch non-tender.  --GROSS MOTOR: Gait is non-antalgic. Easily arises from a seated position. Toe walking and heel walking are normal without significant difficulty.    --LOWER EXTREMITY MOTOR TESTING:  Plantar flexion left 5/5, right 5/5   Dorsiflexion left 5/5, right 5/5   Great toe MTP extension left 5/5, right 5/5  Knee flexion left 5/5, right 5/5  Knee extension left 5/5,  right 5/5   Hip flexion left 5/5, right 5/5  Hip abduction left 5/5, right 5/5  Hip adduction left 5/5, right 5/5   --HIPS: Full range of motion bilaterally. Negative FABERs on the involved lower extremity.   --NEUROLOGICAL:  2/4 patellar, medial hamstring, and achilles reflexes bilaterally.  Sensation to light touch is intact in the bilateral L4, L5, and S1 dermatomes. Babinski is negative. No clonus.  --VASCULAR:  2/4 dorsalis pedis and posterior tibialsi pulses bilaterally.  Bilateral lower extremities are warm.  There is no pitting edema of the bilateral lower extremities.    RESULTS: Prior medical records from 2015 were reviewed today.    Imaging: MRI of the lumbar spine was personally reviewed today. The images were shown to the patient and the findings were explained using a spine model.    Lumbar CT scan 9/10/2015  Conclusion:  1.  There is chronic appearing left pars interarticularis fracture at L5.  Sclerotic changes of the right pars interarticularis and inferior articulating facet of L5 is presumably secondary to advanc degenerative facet arthropathy on the right at L5-S1.  Alternatively, sclerosis involving the pars interarticularis may be associated with healed pars fracture.  2.  No acute lumbar spine fracture.  3.  At L4-5, there is mild circumferential bulge with ligamentum flavum hypertrophy and mild spinal canal stenosis.  Mild neural foraminal narrowing on the right at this level.  4.  Diffuse osteopenia.

## 2021-06-11 NOTE — PROGRESS NOTES
[facility] Daily Progress     Patient Name: Claudio Chambers  Date: 7/10/2017  Visit #: 2  PTA visit #:  -  Referral Diagnosis:   Chronic right-sided low back pain without sciatica  Referring provider: Cara Shine C*  Visit Diagnosis:     ICD-10-CM    1. Chronic bilateral low back pain with bilateral sciatica M54.42     M54.41     G89.29    2. Generalized muscle weakness M62.81    3. Abnormal posture R29.3         Claudio Chambers is a 67 y.o. male who presents to therapy today with chief complaints of B LBP with radiating pain and numbness into B LEs. Pain has been ongoing for about 10-12 years without a known injury. Recent imaging showing unstable spondylolisthesis at L5-S1 and left L5 pars defect per provider report. Pt is very reluctant with any movement or activity. He has had PT in the past with minimal changes. Pt presents with limited lumbar ROM due to pain and fear of increasing pain, but functional LE strength. Numbness of LEs begins with prolonged walking and mainly at lateral and anterior aspects of the leg to the knee. Pt has difficulty self-managing pain with lying supine on lazy boy only providing relief. Pt reported h/o ICD, congestive heart failure, and CAD.  Pain symptoms are getting worse.  Functional impairments include transfers, bed mobility, bending, lifting, meal preep, dressing, bathing, ADLs, carrying items, and sleeping.  Pt demo's signs and sx consistent with spondylolisthesis.     Assessment:     HEP/POC compliance is  good .     The patient was tested on MEDX today with good tolerance. He is above average strength, though he was using his legs during testing. The patient was hesitant about rotation movements and rotary torso, but after performing rotary torso was surprised with how this went. The patient does seem to have a mindset of needing surgery no matter what, and future education about his situation will hopefully benefit the patient to understand his  "potential to not need a surgery necessarily.    Goal Status:  No Data Recorded  Pt will: be independent with HEP within 3 weeks.  Pt will: be able to walk for 10 minutes or more without pain within 6 weeks.  Pt will: be able to perform bed mobility with 2/10 pain or less within 6 weeks.  Pt will: demonstrate improved lumbar ROM in order to play golf within 8 weeks.    Plan / Patient Education:     Continue with initial plan of care.     Plan for next visit: review HEP, MEDX DE, rotary torso, passive/active nerve glides, core progression    Subjective:     Pain Rating: Really uncomfortable.     Feels good to lazy boy recliner. Hesitant with any turning and feels that it will hurt his back. \"My back is broken, I need surgery.\"    Objective:     MED X Testing Lumbar Initial 7/10/17 4 week re-test -   AROM (full ROM 0-72) 0-39    Max Torque  367#    Avg Torque  329#    Flex/ext Ratio (ideal 1.4:1) 1.25:1      Education on importance of movement and strengthening for his back.  Reviewed HEP    Treatment Today     TREATMENT MINUTES COMMENTS   Evaluation     Self-care/ Home management     Manual therapy     Neuromuscular Re-education     Therapeutic Activity     Therapeutic Exercises 30 Testing and DE on MEDX, rotary torso, review of HEP.   Gait training     Modality__________________                Total 30    Blank areas are intentional and mean the treatment did not include these items.       Man FISH  7/10/2017      "

## 2021-06-12 NOTE — PROGRESS NOTES
[facility] Daily Progress     Patient Name: Claudio Chambers  Date: 8/10/2017  Visit #: 6  PTA visit #:  -  Referral Diagnosis:   Chronic right-sided low back pain without sciatica  Referring provider: Cara Shine C*  Visit Diagnosis:     ICD-10-CM    1. Chronic bilateral low back pain with bilateral sciatica M54.42     M54.41     G89.29    2. Generalized muscle weakness M62.81    3. Abnormal posture R29.3         Claudio Chambers is a 67 y.o. male who presents to therapy today with chief complaints of B LBP with radiating pain and numbness into B LEs. Pain has been ongoing for about 10-12 years without a known injury. Recent imaging showing unstable spondylolisthesis at L5-S1 and left L5 pars defect per provider report. Pt is very reluctant with any movement or activity. He has had PT in the past with minimal changes. Pt presents with limited lumbar ROM due to pain and fear of increasing pain, but functional LE strength. Numbness of LEs begins with prolonged walking and mainly at lateral and anterior aspects of the leg to the knee. Pt has difficulty self-managing pain with lying supine on lazy boy only providing relief. Pt reported h/o ICD, congestive heart failure, and CAD.  Pain symptoms are getting worse.  Functional impairments include transfers, bed mobility, bending, lifting, meal preep, dressing, bathing, ADLs, carrying items, and sleeping.  Pt demo's signs and sx consistent with spondylolisthesis.     Assessment:     HEP/POC compliance is  good .     Pt has been progressing well and having less overall pain. Pt having relief with long axis distraction as well. Pt continues to demonstrate poor core stability with exercises, but improving. Pt having less radicular pain which he is very pleased about. Pt is appropriate to continue with PT and MedX program for further core stability and back strengthening.     Goal Status:  Pt will: be independent with HEP within 3 weeks.  Pt will: be able  "to walk for 10 minutes or more without pain within 6 weeks.  Pt will: be able to perform bed mobility with 2/10 pain or less within 6 weeks.  Pt will: demonstrate improved lumbar ROM in order to play golf within 8 weeks.    Plan / Patient Education:     Continue with initial plan of care.     Plan for next visit: review HEP, MEDX DE, rotary torso, passive/active nerve glides, core progression (review UUDD, quadruped), long axis distraction as helpful    Subjective:     Pain Rating: \"a little on the right side\"  Pt felt \"very fluid\" after last session. He shares that the leg pulling really helped. He notices minimal leg pain and less often which he is pleased about.       Objective:     MED X Testing Lumbar Initial 7/10/17 4 week re-test -   AROM (full ROM 0-72) 0-39    Max Torque  367#    Avg Torque  329#    Flex/ext Ratio (ideal 1.4:1) 1.25:1      Increased difficulty with MedX DE today, limited extension with resistance    Weakness evident with PPT and exercises, poor control with UUDD   Improved with reps and slowing pace    Continued relief with long axis distraction    Treatment Today     TREATMENT MINUTES COMMENTS   Evaluation     Self-care/ Home management     Manual therapy 5 In supine:  - bilateral long axis distraction  - R long axis distraction   Neuromuscular Re-education     Therapeutic Activity     Therapeutic Exercises 25 See exercise and MedX flowsheets. Added UUDD to HEP.   Gait training     Modality__________________                Total 30    Blank areas are intentional and mean the treatment did not include these items.       Jose Bardales, PT, DPT  8/10/2017  "

## 2021-06-12 NOTE — PROGRESS NOTES
INr 3.4 increase greens. After talking with pt and discussing history of greens/salads and medication change. Pt will  continue  with current diet and dosing of Warfarin.  Continue with moderation of Vit K and green leafy vegetables. Cautioned to call with increase bruising or bleeding. Reminded to call with medication change especially antibiotic. Call with any questions or concerns or any up coming procedures. Cautioned about using Herbal medication.

## 2021-06-12 NOTE — PROGRESS NOTES
INR 2.9 INR stable. Discussed continuing management of dose of Warfarin and returning in one month . No changes to diet needed at this time. Continue moderate intake of Vitamin K and call if increase bruising or unexplained bleeding. Call with medication changes or upcoming procedures.

## 2021-06-12 NOTE — PROGRESS NOTES
[facility] Daily Progress     Patient Name: Claudio Chambers  Date: 7/27/2017  Visit #: 5  PTA visit #:  -  Referral Diagnosis:   Chronic right-sided low back pain without sciatica  Referring provider: Cara Shine C*  Visit Diagnosis:     ICD-10-CM    1. Chronic bilateral low back pain with bilateral sciatica M54.42     M54.41     G89.29    2. Generalized muscle weakness M62.81    3. Abnormal posture R29.3         Claudio Chambers is a 67 y.o. male who presents to therapy today with chief complaints of B LBP with radiating pain and numbness into B LEs. Pain has been ongoing for about 10-12 years without a known injury. Recent imaging showing unstable spondylolisthesis at L5-S1 and left L5 pars defect per provider report. Pt is very reluctant with any movement or activity. He has had PT in the past with minimal changes. Pt presents with limited lumbar ROM due to pain and fear of increasing pain, but functional LE strength. Numbness of LEs begins with prolonged walking and mainly at lateral and anterior aspects of the leg to the knee. Pt has difficulty self-managing pain with lying supine on lazy boy only providing relief. Pt reported h/o ICD, congestive heart failure, and CAD.  Pain symptoms are getting worse.  Functional impairments include transfers, bed mobility, bending, lifting, meal preep, dressing, bathing, ADLs, carrying items, and sleeping.  Pt demo's signs and sx consistent with spondylolisthesis.     Assessment:     HEP/POC compliance is  good .     Pt having more increases in pain with sitting over last couple days. He continues to tolerate MedX well without any increases in pain. He did have some relief with long axis distraction both bilateral and single leg (R). Compliance with HEP is inconsistent which is why he requires a review of HEP.    Goal Status:  Pt will: be independent with HEP within 3 weeks.  Pt will: be able to walk for 10 minutes or more without pain within 6  "weeks.  Pt will: be able to perform bed mobility with 2/10 pain or less within 6 weeks.  Pt will: demonstrate improved lumbar ROM in order to play golf within 8 weeks.    Plan / Patient Education:     Continue with initial plan of care.     Plan for next visit: review HEP, MEDX DE, rotary torso, passive/active nerve glides, core progression (review [place], SLRs)    Subjective:     Pain Rating: \"same\"    \"Same old same old.\" Pt has noticed more pain while sitting at work. He has been wearing his back brace more to help with that.      Objective:     MED X Testing Lumbar Initial 7/10/17 4 week re-test -   AROM (full ROM 0-72) 0-39    Max Torque  367#    Avg Torque  329#    Flex/ext Ratio (ideal 1.4:1) 1.25:1      Continued high repetitions with MedX DE, increased use of LEs    Fatigues quickly with clamshells    No pain with rotary torso or MedX DE    Mild relief of R LBP with long axis distraction    Treatment Today     TREATMENT MINUTES COMMENTS   Evaluation     Self-care/ Home management     Manual therapy 10 In supine:  - bilateral long axis distraction  - R long axis distraction   Neuromuscular Re-education     Therapeutic Activity     Therapeutic Exercises 20 See exercise and MedX flowsheets. Added clamshells.   Gait training     Modality__________________                Total 30    Blank areas are intentional and mean the treatment did not include these items.       Jose Bardales, PT, DPT  7/27/2017  "

## 2021-06-13 NOTE — PROGRESS NOTES
INR 3.0 INR stable. Discussed continuing management of dose of Warfarin and returning in one month . No changes to diet needed at this time. Continue moderate intake of Vitamin K and call if increase bruising or unexplained bleeding. Call with medication changes or upcoming procedures.

## 2021-06-13 NOTE — TELEPHONE ENCOUNTER
Called pt to confirm receiving cologuard kit; patient moved to [facility] and has established care with a new PCP outside of network. Canceled his AWV 3/2021 upon his request

## 2021-06-13 NOTE — PROGRESS NOTES
[facility] Discharge Summary  Patient Name: Claudio Chambers  Date: 9/25/2017  Referral Diagnosis: chronic LBP  Referring provider: Ramírez Pedersen MD  Visit Diagnosis:   1. Chronic bilateral low back pain with bilateral sciatica     2. Generalized muscle weakness     3. Abnormal posture         Goals:  Pt will: be independent with HEP within 3 weeks.  Pt will: be able to walk for 10 minutes or more without pain within 6 weeks.  Pt will: be able to perform bed mobility with 2/10 pain or less within 6 weeks.  Pt will: demonstrate improved lumbar ROM in order to play golf within 8 weeks.    Patient was seen for 6 visits from initial evaluation to 8/10/17 with 1-2 missed appointments.  The patient attended therapy initially, but did not finish the therapy sessions prescribed.  Goals were not fully achieved. Explanation for goals not achieved: did not return  Patient received a home program   The patient discontinued therapy, did not return.  No further therapy is required at this time.  Pt had been progressing with PT, but did not return to PT following 6th session.     Therapy will be discontinued at this time.  The patient will need a new referral to resume.    Thank you for your referral.  Jose Bardales, PT, DPT  9/25/2017  12:16 PM

## 2021-06-13 NOTE — PROGRESS NOTES
INR 3.5 decrease dose to 5 mg Monday and 2.5 mg all other days. Retest in 2 weeks. Increase greens. After talking with pt and discussing history of greens/salads and medication change. Pt will  continue  with current diet and dosing of Warfarin.  Continue with moderation of Vit K and green leafy vegetables. Cautioned to call with increase bruising or bleeding. Reminded to call with medication change especially antibiotic. Call with any questions or concerns or any up coming procedures. Cautioned about using Herbal medication.

## 2021-06-13 NOTE — PROGRESS NOTES
INR 4.4 pt is having back inflammation and waiting for injection. He will call office today to set injection up. Will hold today's warfarin and then 2.5 mg daily until next Friday. After talking with pt and discussing history of greens/salads and medication change. Pt will  continue  with current diet and dosing of Warfarin.  Continue with moderation of Vit K and green leafy vegetables. Cautioned to call with increase bruising or bleeding. Reminded to call with medication change especially antibiotic. Call with any questions or concerns or any up coming procedures. Cautioned about using Herbal medication.

## 2021-06-14 NOTE — PROGRESS NOTES
INR 2.4 will continue on current dose. After talking with pt and discussing history of greens/salads and medication change. Pt will  continue  with current diet and dosing of Warfarin.  Continue with moderation of Vit K and green leafy vegetables. Cautioned to call with increase bruising or bleeding. Reminded to call with medication change especially antibiotic. Call with any questions or concerns or any up coming procedures. Cautioned about using Herbal medication.

## 2021-06-14 NOTE — PROGRESS NOTES
INR 1.7 increase dose to 5 mg M and F and 5 mg all other days. After talking with pt and discussing history of greens/salads and medication change. Pt will  continue  with current diet and dosing of Warfarin.  Continue with moderation of Vit K and green leafy vegetables. Cautioned to call with increase bruising or bleeding. Reminded to call with medication change especially antibiotic. Call with any questions or concerns or any up coming procedures. Cautioned about using Herbal medication.

## 2021-06-15 NOTE — PROGRESS NOTES
INR 3.0 increase greens. After talking with pt and discussing history of greens/salads and medication change. Pt will  continue  with current diet and dosing of Warfarin.  Continue with moderation of Vit K and green leafy vegetables. Cautioned to call with increase bruising or bleeding. Reminded to call with medication change especially antibiotic. Call with any questions or concerns or any up coming procedures. Cautioned about using Herbal medication.

## 2021-06-16 NOTE — TELEPHONE ENCOUNTER
Telephone Encounter by Zara Kay RN at 3/18/2020 12:31 PM     Author: Zara Kay RN Service: -- Author Type: Registered Nurse    Filed: 3/18/2020  2:28 PM Encounter Date: 3/18/2020 Status: Attested    : Zara Kay RN (Registered Nurse) Cosigner: Trina Henriquez MD at 3/18/2020  2:55 PM    Attestation signed by Trina Henriquez MD at 3/18/2020  2:55 PM    As above                  ANTICOAGULATION  MANAGEMENT    Assessment     Today's INR result of 4.2 is Supratherapeutic (goal INR of 2.0-3.0)        Missed dose(s) may be affecting INR    Diverticulitis symptoms may be affecting diet and INR     Will complete 10 days course of Metronidazole and Cipro tomorrow    Interaction between Metronidazole, Cipro and warfarin may be affecting INR    Continues to tolerate warfarin with no reported s/s of bleeding or thromboembolism     Previous INR was Supratherapeutic dose adjustment done    Plan:     Sent message via [employer] per patient's preference regarding INR result and instructed:     Warfarin Dosing Instructions:  hold warfarin dose today then continue current warfarin dose    5 mg every Mon; 2.5 mg all other days     (0 % change)    Instructed patient to follow up no later than: 7-10 days  Instructed patient to call [facility] @ [phone] if develops signs and symptoms of cough, fever, sore throat prior to INR appointment.        Education provided: impact of vitamin K foods on INR, importance of therapeutic range, target INR goal and significance of current INR result and monitoring for bleeding signs and symptoms        Instructed to call the [facility] for any changes, questions or concerns. (#[phone])   ?   Zara Kay RN    Subjective/Objective:      Claudio SHAMIR Chambers, a 69 y.o. male is on warfarin.     Claudio reports:     Home warfarin dose: as updated on anticoagulation calendar per template     Missed doses: Yes: Sat and Sun      Medication changes:  Yes:  medications for diverticulitis     S/S of bleeding or thromboembolism:  No     New Injury or illness:  Yes: see above     Changes in diet or alcohol consumption:  Yes: eating less due to diverticulitis symptoms     Upcoming surgery, procedure or cardioversion:  Yes: colonoscopy tbd    Anticoagulation Episode Summary     Current INR goal:   2.0-3.0   TTR:   46.8 % (1 y)   Next INR check:   3/27/2020   INR from last check:   4.20! 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

## 2021-06-16 NOTE — TELEPHONE ENCOUNTER
Telephone Encounter by Zara Kay RN at 3/9/2020  3:08 PM     Author: Zara Kay RN Service: -- Author Type: Registered Nurse    Filed: 3/9/2020  3:19 PM Encounter Date: 3/9/2020 Status: Attested    : Zara Kay RN (Registered Nurse) Cosigner: Trina Henriquez MD at 3/9/2020  3:21 PM    Attestation signed by Trina Henriquez MD at 3/9/2020  3:21 PM    As noted above.                ANTICOAGULATION  MANAGEMENT - BPA Interacting Medication Review    Interacting medication(s): Metronidazole (Flagyl) with warfarin.  Cipro with warfarin    Duration: 10 days, 3/9 to 3/19    New medication?:  Yes, interaction per [employer], may increase INR and risk of bleeding.    Plan:    Sent message to Sonny via [employer] regarding potential interaction.     Warfarin instructions: continue current warfarin dose    Follow up INR recommended in:  3/12/2020     Anticoagulation calendar updated    Zara Kay RN

## 2021-06-16 NOTE — PROGRESS NOTES
INR 1.5 pt has had gi bug and cold. Increase sat dose to 5 mg and then return to current dose. After talking with pt and discussing history of greens/salads and medication change. Pt will  continue  with current diet and dosing of Warfarin.  Continue with moderation of Vit K and green leafy vegetables. Cautioned to call with increase bruising or bleeding. Reminded to call with medication change especially antibiotic. Call with any questions or concerns or any up coming procedures. Cautioned about using Herbal medication.

## 2021-06-16 NOTE — TELEPHONE ENCOUNTER
Telephone Encounter by Torey Akhtar, RN at 2/19/2020 10:58 AM     Author: Torey Akhtar RN Service: -- Author Type: Registered Nurse    Filed: 2/19/2020 11:00 AM Encounter Date: 2/19/2020 Status: Signed    : Torey Akhtar RN (Registered Nurse)       Copied from [facility] communication:  Montana Weldon, DO  You 18 hours ago (4:15 PM)      Please fill for 90 days with 3 refills.      Routing comment       Sonny Chambers Mitchell Allen, DO 21 hours ago (1:33 PM)         I need this filled, can you do this for me at this time??     lisinopriL 10 MG tablet   Commonly known as: Katie MACE   This medication isn't available for refill through [employer] at this time.   Take 1 tablet (10 mg total) by mouth daily.   PrescribedJuly 12, 2019Approved byRamírez Pedersen MDQuantity90 tablets3 refills before July 11, 2020

## 2021-06-17 NOTE — PATIENT INSTRUCTIONS - HE
Patient Instructions by Montana Weldon DO at 8/2/2019 11:30 AM     Author: Montana Weldon DO Service: -- Author Type: Physician    Filed: 8/2/2019 12:13 PM Encounter Date: 8/2/2019 Status: Signed    : Montana Weldon DO (Physician)         It was a pleasure to meet with you today in clinic.  Please do not hesitate to call the [facility] clinic with any questions or concerns at [phone].    Test Results:   You should receive a phone call from this office informing you of test or procedure results within 3 business days of the test being performed.  If you do not hear from our office with the test results within 1 week please do not hesitate to call asking for these results.    Additional Provider Instructions:   Below is a list of any additional instructions from your provider:   1. Continue current medications  2. Continue home blood pressure monitoring.      Sincerely,

## 2021-06-17 NOTE — PROGRESS NOTES
INR 2.0 Continue current management dosing of Warfarin. Continue  diet of moderate Vitamin K intake. Discussed with pt the need to call with questions or concerns or any change in medication especially herbal medication or OTC. Call with increased bleeding or bruising or any upcoming procedures.  Retest in one month at Dr. Pedersen at pre-op [facility]. Will notify Dr. Weldon for stopping of Warfarin pre-op.

## 2021-06-17 NOTE — TELEPHONE ENCOUNTER
Telephone Encounter by Deneen Bond RN at 3/31/2020 12:17 PM     Author: Deneen Bond RN Service: -- Author Type: Registered Nurse    Filed: 3/31/2020  1:12 PM Encounter Date: 3/31/2020 Status: Attested    : Deneen Bond RN (Registered Nurse) Cosigner: Trina Henriquez MD at 3/31/2020  2:56 PM    Attestation signed by Trina Henriquez MD at 3/31/2020  2:56 PM    As above                ANTICOAGULATION  MANAGEMENT    Assessment     Today's INR result of 2.0 is Therapeutic (goal INR of 2.0-3.0)        Warfarin taken as previously instructed    No new diet changes affecting INR    No new medication/supplements affecting INR    Continues to tolerate warfarin with no reported s/s of bleeding or thromboembolism     Previous INR was Supratherapeutic    Plan:     Left a detailed message for Claudio on [employer] regarding INR result and instructed:     Warfarin Dosing Instructions:  Continue current warfarin dose 5 mg daily on mon; and 2.5 mg daily rest of week  (0 % change)    Instructed patient to follow up no later than: two weeks      Instructed to call the [facility] for any changes, questions or concerns. (#[phone])   ?   Deneen Bond RN    Subjective/Objective:      Claudio Chambers, a 69 y.o. male is on warfarin.     Claudio reports:     Home warfarin dose: as updated on anticoagulation calendar per template     Missed doses: No     Medication changes:  No     S/S of bleeding or thromboembolism:  No     New Injury or illness:  No     Changes in diet or alcohol consumption:  No     Upcoming surgery, procedure or cardioversion:  No    Anticoagulation Episode Summary     Current INR goal:   2.0-3.0   TTR:   44.9 % (1 y)   Next INR check:   4/14/2020   INR from last check:   2.00 (3/31/2020)   Weekly max warfarin dose:      Target end date:      INR check location:      Preferred lab:      Send INR reminders to:   [facility]    Indications    Left ventricular thrombus  following MI (H) [I23.6]  Paroxysmal ventricular tachycardia (H) [I47.2]           Comments:   new INR order 3/5/19         Anticoagulation Care Providers     Provider Role Specialty Phone number    Ramírez Pedersen MD Responsible Internal Medicine [phone]    Montana Weldon DO Responsible Cardiology [phone]

## 2021-06-17 NOTE — PROGRESS NOTES
Preoperative Exam    Scheduled Procedure: Lower Back Surgery - Decompression and Fusion  Surgery Date:  6-  Surgery Location: [facility]    Surgeon:  Dr Eleuterio Rich    Assessment/Plan:     1. Preop examination    - Electrocardiogram Perform and Read    2. Benign essential HTN    - Comprehensive Metabolic Panel  - HM2(CBC w/o Differential)    3. Post-infarction thrombus of left ventricle  - INR     Surgical Procedure Risk: Intermediate (reported cardiac risk generally 1-5%)  Have you had prior anesthesia?: Yes  Have you or any family members had a previous anesthesia reaction:  No  Do you or any family members have a history of a clotting or bleeding disorder?:  Sonny is currently on warfarin and will be holding this for 5 days prior to surgery  Cardiac Risk Assessment: Intermediate (reported cardiac risk generally 1-5%)    Patient approved for surgery with general or local anesthesia.    Please Note:  Patient has an implanted device.  Device Type: Dual ICD      Device Vendor:  [email or web address]    Functional Status: Independent  Patient plans to recover at home alone.     Subjective:      Claudio Chambers is a 67 y.o. male who presents for a preoperative consultation.  He is scheduled for a decompression and fusion of his lumbar spine on 6/1/2018.  He has ongoing chronic back pain with some radicular discomfort.  He has a significant cardiac history.  He has had a ST segment elevation acute MI in 5/2013 with resultant apical hypokinesis.  LAD and RCA stents were placed.  He had developed a left ventricular thrombus in the apex.  He has thus been on warfarin ever since.  He had some atypical chest pain in 2016 which resulted in him having a repeat coronary angiogram in 3/2016.  His LAD and RCA stents were normal.  He had no other cardiac disease noted.  Sonny has no chest pain ever.  He carries nitroglycerin but has never used it.  He has been very compliant with warfarin therapy and has  not had any clotting or bleeding issues since being on this medication.  His cardiologist Dr. Weldon recommends that we do not bridge him for coagulation prior to surgery but can stop his warfarin for 5 days prior to surgery and resume warfarin postop.  Additional cardiac history is that he has a [employer] implantable cardiac defibrillator dual in situ.  [facility] operating room be notified of this device    All other systems reviewed and are negative, other than those listed in the HPI.    Pertinent History  Do you have difficulty breathing or chest pain after walking up a flight of stairs: NO  History of obstructive sleep apnea: No  Steroid use in the last 6 months: No  Frequent Aspirin/NSAID use: Yes, he is on 81 mg of aspirin daily and I have asked him to continue this  Prior Blood Transfusion: No  Prior Blood Transfusion Reaction: No  If for some reason prior to, during or after the procedure, if it is medically indicated, would you be willing to have a blood transfusion?:  There is no transfusion refusal.    Current Outpatient Prescriptions   Medication Sig Dispense Refill     aspirin 81 MG EC tablet Take 81 mg by mouth daily.       atorvastatin (LIPITOR) 40 MG tablet Take 1 tablet (40 mg total) by mouth daily. 90 tablet 0     carvedilol (COREG) 6.25 MG tablet Take 1 tablet (6.25 mg total) by mouth 2 (two) times a day. 180 tablet 3     COQ10, UBIQUINOL, ORAL Take 2 capsules by mouth daily.       DOCOSAHEXANOIC ACID/EPA (FISH OIL ORAL) Take 4 capsules by mouth daily.       flaxseed Liqd A couple of ounce every morning per pt-- OTC       lisinopril (PRINIVIL,ZESTRIL) 10 MG tablet Take 1 tablet (10 mg total) by mouth daily. 90 tablet 3     nitroglycerin (NITROSTAT) 0.4 MG SL tablet Place 1 tablet (0.4 mg total) under the tongue as needed. 25 tablet 11     warfarin (COUMADIN) 2.5 MG tablet Take 5 mg Monday and Friday and 2.5 mg all other days. 180 tablet 0     No current facility-administered  medications for this visit.         No Known Allergies    Patient Active Problem List   Diagnosis     ICD (implantable cardioverter-defibrillator), dual, in situ     Arteriosclerosis of coronary artery     Family history of coronary arteriosclerosis     HDL deficiency     HLD (hyperlipidemia)     Cannot sleep     Cardiomyopathy, ischemic     LBP (low back pain)     Paroxysmal ventricular tachycardia     ST elevation MI (STEMI)     Compulsive tobacco user syndrome     Ischemic cardiomyopathy     Left ventricular thrombus following MI     Chronic systolic CHF (congestive heart failure), NYHA class 2     Coronary artery disease due to lipid rich plaque     Diverticul disease small and large intestine, no perforati or abscess     Medication dose changed       Past Medical History:   Diagnosis Date     CAD (coronary artery disease), native coronary artery 2013    pLAD: 2.75 x 20 mm PROMUS Element stent, p RCA: 3.0 x 30 mm Resolute drug-eluting stent, [facility]     HLD (hyperlipidemia)      ICD (implantable cardioverter-defibrillator) in place     [place] [employer]  Placed for inducible VT, LVEF:40%, ischemic     Ischemic cardiomyopathy LVEF 40%. Apical akinesis     LV (left ventricular) mural thrombus following MI     On warfarin     Myocardial infarction        Past Surgical History:   Procedure Laterality Date     CARDIAC CATHETERIZATION       CARDIAC PACEMAKER PLACEMENT      [employer] dual ICD     CAROTID STENT      pLAD: 2.75 x 20 mm PROMUS Element stent, p RCA: 3.0 x 30 mm Resolute drug-eluting stent, [facility]     CORONARY STENT PLACEMENT      pLAD: 2.75 x 20 mm PROMUS Element stent, p RCA: 3.0 x 30 mm Resolute drug-eluting stent, [facility]       Social History     Social History     Marital status:      Spouse name: N/A     Number of children: N/A     Years of education: N/A     Occupational History     Not on file.     Social History Main Topics     Smoking status: Current Every Day  Smoker     Packs/day: 0.50     Types: Cigarettes     Smokeless tobacco: Never Used     Alcohol use Yes      Comment: Rare     Drug use: No     Sexual activity: Not on file     Other Topics Concern     Not on file     Social History Narrative       Patient Care Team:  Ramírez Pedersen MD as PCP - General (Internal Medicine)  Dr. Weldon-cardiology        Objective:     Vitals:    05/11/18 0918   BP: 114/66   Pulse: 72   SpO2: 97%   Weight: 216 lb (98 kg)   Height: 6' (1.829 m)         Physical Exam:  /66 (Patient Site: Left Arm, Patient Position: Sitting, Cuff Size: Adult Large)  Pulse 72  Ht 6' (1.829 m)  Wt 216 lb (98 kg)  SpO2 97%  BMI 29.29 kg/m2    General Appearance:    Alert, cooperative, no distress, appears stated age   Head:    Normocephalic, without obvious abnormality, atraumatic   Eyes:    PERRL, conjunctiva/corneas clear, EOM's intact, fundi     benign, both eyes        Ears:    Normal TM's and external ear canals, both ears   Nose:   Nares normal, septum midline, mucosa normal, no drainage    or sinus tenderness   Throat:   Lips, mucosa, and tongue normal; teeth and gums normal   Neck:   Supple, symmetrical, trachea midline, no adenopathy;        thyroid:  No enlargement/tenderness/nodules; no carotid    bruit or JVD   Back:     Symmetric, no curvature, ROM normal, no CVA tenderness   Lungs:     Clear to auscultation bilaterally, respirations unlabored   Chest wall:    No tenderness or deformity   Heart:    Regular rate and rhythm, S1 and S2 normal, no murmur, rub    or gallop   Abdomen:     Soft, non-tender, bowel sounds active all four quadrants,     no masses, no organomegaly   Genitalia:     Rectal:     Extremities:   Extremities normal, atraumatic, no cyanosis or edema   Pulses:   2+ and symmetric all extremities   Skin:   Skin color, texture, turgor normal, no rashes or lesions   Lymph nodes:   Cervical, supraclavicular, and axillary nodes normal   Neurologic:   CNII-XII intact.  Normal strength, sensation and reflexes       throughout       There are no Patient Instructions on file for this visit.    EKG: Shows a normal sinus rhythm.  This is a normal EKG and unchanged from 2016    Labs:  Recent Results (from the past 168 hour(s))   Electrocardiogram Perform and Read    Collection Time: 05/11/18  9:28 AM   Result Value Ref Range    SYSTOLIC BLOOD PRESSURE  mmHg    DIASTOLIC BLOOD PRESSURE  mmHg    VENTRICULAR RATE 64 BPM    ATRIAL RATE 64 BPM    P-R INTERVAL 136 ms    QRS DURATION 98 ms    Q-T INTERVAL 394 ms    QTC CALCULATION (BEZET) 406 ms    P Axis -12 degrees    R AXIS 80 degrees    T AXIS 49 degrees    MUSE DIAGNOSIS       Normal sinus rhythm  Normal ECG  When compared with ECG of 21-MAY-2016 12:13,  No significant change was found  Confirmed by TRAVIS PEREZ MD LOC:WW ([id number]) on 5/11/2018 1:54:04 PM     Comprehensive Metabolic Panel    Collection Time: 05/11/18  9:58 AM   Result Value Ref Range    Sodium 142 136 - 145 mmol/L    Potassium 4.5 3.5 - 5.0 mmol/L    Chloride 105 98 - 107 mmol/L    CO2 29 22 - 31 mmol/L    Anion Gap, Calculation 8 5 - 18 mmol/L    Glucose 95 70 - 125 mg/dL    BUN 16 8 - 22 mg/dL    Creatinine 0.97 0.70 - 1.30 mg/dL    GFR MDRD Af Amer >60 >60 mL/min/1.73m2    GFR MDRD Non Af Amer >60 >60 mL/min/1.73m2    Bilirubin, Total 0.9 0.0 - 1.0 mg/dL    Calcium 9.4 8.5 - 10.5 mg/dL    Protein, Total 7.2 6.0 - 8.0 g/dL    Albumin 3.6 3.5 - 5.0 g/dL    Alkaline Phosphatase 71 45 - 120 U/L    AST 17 0 - 40 U/L    ALT 39 0 - 45 U/L   HM2(CBC w/o Differential)    Collection Time: 05/11/18  9:58 AM   Result Value Ref Range    WBC 5.0 4.0 - 11.0 thou/uL    RBC 5.52 4.40 - 6.20 mill/uL    Hemoglobin 15.5 14.0 - 18.0 g/dL    Hematocrit 47.5 40.0 - 54.0 %    MCV 86 80 - 100 fL    MCH 28.1 27.0 - 34.0 pg    MCHC 32.7 32.0 - 36.0 g/dL    RDW 12.3 11.0 - 14.5 %    Platelets 171 140 - 440 thou/uL    MPV 7.4 7.0 - 10.0 fL   INR    Collection Time: 05/11/18  9:58 AM   Result Value  Ref Range    INR 1.76 (H) 0.90 - 1.10       Immunization History   Administered Date(s) Administered     Td,adult,historic,unspecified 06/10/2013     Tdap 06/10/2013     ZOSTER, LIVE 06/10/2013           Electronically signed by Ramírez Pedersen MD 05/11/18 9:22 AM

## 2021-06-17 NOTE — PROGRESS NOTES
INR 1.76 take 5 mg sat and then continue current dosing of 5 mg M and F and 2.5 mg all other days. Retest after 6/1. After talking with pt and discussing history of greens/salads and medication change. Pt will  continue  with current diet and dosing of Warfarin.  Continue with moderation of Vit K and green leafy vegetables. Cautioned to call with increase bruising or bleeding. Reminded to call with medication change especially antibiotic. Call with any questions or concerns or any up coming procedures. Cautioned about using Herbal medication.

## 2021-06-17 NOTE — TELEPHONE ENCOUNTER
Telephone Encounter by Zara Kay RN at 9/11/2020  1:50 PM     Author: Zara Kay RN Service: -- Author Type: Registered Nurse    Filed: 9/11/2020  1:55 PM Encounter Date: 9/3/2020 Status: Attested Addendum    : Zara Kay RN (Registered Nurse)    Related Notes: Original Note by Zara Kay RN (Registered Nurse) filed at 9/11/2020  1:53 PM    Cosigner: Trina Henriquez MD at 9/11/2020  2:08 PM    Attestation signed by Trina Henriquez MD at 9/11/2020  2:08 PM    Per patient request.                ANTICOAGULATION  MANAGEMENT PROGRAM    Claudio Chambers is being discharged from the [facility] Anticoagulation Management Program (ACM).    Reason for discharge: Patient moved per my chart message.    ACM referral closed, anticoagulation episode resolved and INR standing order discontinued.         Zara Kay RN

## 2021-06-18 NOTE — PROGRESS NOTES
INR 1.0 post back surgery. Will increase dose to 2.5 mg Sat and sun and 2.5 mg all other days. Home health seeing pt and will retest in one week. After talking with pt and discussing history of greens/salads and medication change. Pt will  continue  with current diet and dosing of Warfarin.  Continue with moderation of Vit K and green leafy vegetables. Cautioned to call with increase bruising or bleeding. Reminded to call with medication change especially antibiotic. Call with any questions or concerns or any up coming procedures. Cautioned about using Herbal medication.

## 2021-06-18 NOTE — LETTER
Letter by Deisy Clement RDCS at      Author: Deisy Clement RDCS Service: -- Author Type: --    Filed:  Encounter Date: 1/1/2019 Status: (Other)       Claudio Chambers  [address] [facility] [address]  [place] [address]      January 2, 2019      Dear Mr. Chambers,    RE: Remote Results    We are writing to you regarding your recent Remote ICD check from home. Your transmission was received successfully. Battery status is satisfactory at this time.     Your results are within normal limits.    Your next device appointment will be a clinic visit.  Please call in early January to schedule.      To schedule or reschedule, please call [phone] and press 1.    NOTE: If you would like to do an extra transmission, please call [phone] and press 3 to speak to a nurse BEFORE transmitting. This ensures that the Device Clinic staff is aware of the reason you are sending a transmission, and can follow-up with you after it has been reviewed.    We will be checking your implanted device from home (remotely) every three months unless otherwise instructed. We will need to see you in the clinic at least once a year. You may need to be seen in the clinic sooner depending on the results of your check.    Please be aware:    The follow-up schedule is like a Physician prescription.    Your remote monitor is paired to your specific implanted device.      Sincerely,    [facility]

## 2021-06-18 NOTE — PROGRESS NOTES
Office Visit - Follow Up   Claudio Chambers   68 y.o. male    Date of Visit: 6/19/2018    Chief Complaint   Patient presents with     Follow-up     Post op visit - S/P L3 decompression on 6-1 - Needs INR - in general feeling pretty good, having night sweets pretty severe, more tired than ususal        Assessment and Plan   1. Benign essential HTN  Blood pressure was low postoperatively in the 80s and he had a syncopal episode.  Postoperatively then they held his Coreg and lisinopril.  He is now back on Coreg at 6.25 mg twice per day and lisinopril 10 mg, a half tablet per day.  With his blood pressure today at 104/68 I think he should stay on this regimen.  - Erythrocyte Sedimentation Rate  - Comprehensive Metabolic Panel  - HM2(CBC w/o Differential)    2. Night sweats  Since surgery he has been having some night sweats.  He had a severe episode 2 nights ago.  Intermittently it is more mild.  No weight loss.  No documented fevers.  We discussed checking these laboratory studies but then also try to sleep during the summer with just a sheet and no comforter.  - Erythrocyte Sedimentation Rate  - Comprehensive Metabolic Panel  - HM2(CBC w/o Differential)    3. Cardiomyopathy, ischemic  Vanessa and warfarin therapy.  No bridging was done during surgery.  This worked well.  Last INR was therapeutic 1 week ago.  He continues to be followed by cardiology.    4. Low back pain  Status post a single level decompression and fusion on 6/1/2018 well.  He is not back to work.  He is seeing his spine surgeon on July 9 and then hopes to be back to work thereafter.  Good results from his surgery.  No further radicular pain.  Still has some muscular type pain in his back.          No Follow-up on file.     History of Present Illness   This 68 y.o. old returns for follow-up after his surgery.  Overall surgery went well.  He was however complicated by some postoperative syncope that occurred upon arising out of his bed.  The pressure  was low in the 80s and thus they stopped his Coreg and lisinopril.  This was continued on hold as he left the hospital.  He is now seeing Dr. Weldon in cardiology follow-up and a have restarted his Coreg at low dose and half of the dose of his lisinopril.  He feels well with this.  He still needs to be careful with a bit of orthostatic symptoms.    Review of Systems: A comprehensive review of systems was negative except as noted.     Medications, Allergies and Problem List   Reviewed and updated     Physical Exam   General Appearance:       /68 (Patient Site: Left Arm, Patient Position: Sitting, Cuff Size: Adult Large)  Pulse 74  Ht 6' (1.829 m)  Wt 214 lb (97.1 kg)  SpO2 99%  BMI 29.02 kg/m2    Blood pressure is 104/68.  Pulse is 74 and regular.  Incision in his back is clean.  Lungs are clear.  Gait is normal.  No edema.     Additional Information   Current Outpatient Prescriptions   Medication Sig Dispense Refill     aspirin 81 MG EC tablet Take 81 mg by mouth daily.       atorvastatin (LIPITOR) 40 MG tablet Take 1 tablet (40 mg total) by mouth daily. 90 tablet 0     carvedilol (COREG) 6.25 MG tablet Take 1 tablet (6.25 mg total) by mouth 2 (two) times a day. 180 tablet 3     COQ10, UBIQUINOL, ORAL Take 2 capsules by mouth daily.       DOCOSAHEXANOIC ACID/EPA (FISH OIL ORAL) Take 4 capsules by mouth daily.       flaxseed Liqd A couple of ounce every morning per pt-- OTC       lisinopril (PRINIVIL,ZESTRIL) 10 MG tablet Take 1 tablet (10 mg total) by mouth daily. (Patient taking differently: Take 5 mg by mouth daily. ) 90 tablet 3     nitroglycerin (NITROSTAT) 0.4 MG SL tablet Place 1 tablet (0.4 mg total) under the tongue as needed. 25 tablet 11     senna-docusate (SENNOSIDES-DOCUSATE SODIUM) 8.6-50 mg tablet Take 1 tablet by mouth 2 (two) times a day. Indications: constipation       warfarin (COUMADIN) 2.5 MG tablet Take 5 mg Monday and Friday and 2.5 mg all other days. 180 tablet 0     No current  facility-administered medications for this visit.      No Known Allergies  Social History   Substance Use Topics     Smoking status: Current Every Day Smoker     Packs/day: 0.50     Types: Cigarettes     Smokeless tobacco: Never Used     Alcohol use Yes      Comment: Rare       Review and/or order of clinical lab tests:  Review and/or order of radiology tests:  Review and/or order of medicine tests:  Discussion of test results with performing physician:  Decision to obtain old records and/or obtain history from someone other than the patient:  Review and summarization of old records and/or obtaining history from someone other than the patient and.or discussion of case with another health care provider:  Independent visualization of image, tracing or specimen itself:    Time: total time spent with the patient was 25 minutes of which >50% was spent in counseling and coordination of care     Ramírez Pedersen MD

## 2021-06-18 NOTE — PROGRESS NOTES
Office Visit - Follow Up   Claudio Chambers   68 y.o. male    Date of Visit: 6/26/2018    Chief Complaint   Patient presents with     Night Sweats     S/P back surgery 6-1-2018 / night sweats continues and swelling around incision, off all pain meds x 10-14 days        Assessment and Plan   1. DDD (degenerative disc disease), lumbar  Is been concerned about some swelling in his left back area for the past 2 or 3 days.  Called his spine surgeon and suggested an office visit here as needed.  His wound is entirely healed.  There is no evidence of any redness or tenderness.  Just a minimal amount of swelling around the wound.  No signs of infection whatsoever.    2. Night sweats  Still is having night sweats for the past 2 weeks now.  His a CBC sed rate and CMP were all normal last visit.  He thinks this is getting a bit better.  Suggest he try some Tylenol PM and see if this helps.          No Follow-up on file.     History of Present Illness   This 68 y.o. old comes in today worried about swelling in his back.  He had an L3 decompression surgery on 6/1/2018.  Reports now for the past 2 or 3 days he thinks he has had some puffiness in his low back area just above the top of his incision.  Denies any prominent tenderness.  No fevers.  He also reports he still is having some night sweats.  These are somewhat less severe than a week ago.  Laboratory studies as described above were negative.  He is having some vivid dreams throughout the night and is tossing and turning a lot throughout the night.  He has no documented fevers.  His weight is stable.  He is on no new medications that could be contributing or causing this.  Pressure at this time is back towards normal.  He remains on carvedilol and lisinopril.    Review of Systems: A comprehensive review of systems was negative except as noted.     Medications, Allergies and Problem List   Reviewed and updated     Physical Exam   General Appearance:       /70  (Patient Site: Right Arm, Patient Position: Sitting, Cuff Size: Adult Large)  Pulse 76  Ht 6' (1.829 m)  Wt 213 lb (96.6 kg)  SpO2 97%  BMI 28.89 kg/m2    He has a completely healed large incision in his lumbar spine area.  There is minimal surrounding swelling at the top end of the incision.  No evidence of redness.  No evidence of cellulitis.  No drainage from the wound.  Blood pressure is quite excellent at 116/70 pulse is 76.     Additional Information   Current Outpatient Prescriptions   Medication Sig Dispense Refill     aspirin 81 MG EC tablet Take 81 mg by mouth daily.       atorvastatin (LIPITOR) 40 MG tablet Take 1 tablet (40 mg total) by mouth daily. 90 tablet 0     carvedilol (COREG) 6.25 MG tablet Take 1 tablet (6.25 mg total) by mouth 2 (two) times a day. 180 tablet 3     COQ10, UBIQUINOL, ORAL Take 2 capsules by mouth daily.       DOCOSAHEXANOIC ACID/EPA (FISH OIL ORAL) Take 4 capsules by mouth daily.       flaxseed Liqd A couple of ounce every morning per pt-- OTC       lisinopril (PRINIVIL,ZESTRIL) 10 MG tablet Take 1 tablet (10 mg total) by mouth daily. (Patient taking differently: Take 5 mg by mouth daily. ) 90 tablet 3     nitroglycerin (NITROSTAT) 0.4 MG SL tablet Place 1 tablet (0.4 mg total) under the tongue as needed. 25 tablet 11     senna-docusate (SENNOSIDES-DOCUSATE SODIUM) 8.6-50 mg tablet Take 1 tablet by mouth 2 (two) times a day. Indications: constipation       warfarin (COUMADIN) 2.5 MG tablet Take 5 mg Monday and Friday and 2.5 mg all other days. 180 tablet 0     No current facility-administered medications for this visit.      No Known Allergies  Social History   Substance Use Topics     Smoking status: Current Every Day Smoker     Packs/day: 0.50     Types: Cigarettes     Smokeless tobacco: Never Used     Alcohol use Yes      Comment: Rare       Review and/or order of clinical lab tests:  Review and/or order of radiology tests:  Review and/or order of medicine  tests:  Discussion of test results with performing physician:  Decision to obtain old records and/or obtain history from someone other than the patient:  Review and summarization of old records and/or obtaining history from someone other than the patient and.or discussion of case with another health care provider:  Independent visualization of image, tracing or specimen itself:    Time: total time spent with the patient was 15 minutes of which >50% was spent in counseling and coordination of care     Ramírez Pedersen MD

## 2021-06-18 NOTE — PROGRESS NOTES
INR 2.6 Continue current management dosing of Warfarin. Continue  diet of moderate Vitamin K intake. Discussed with pt the need to call with questions or concerns or any change in medication especially herbal medication or OTC. Call with increased bleeding or bruising or any upcoming procedures.  M and F 5 mg and 2.5 mg all other days.

## 2021-06-19 NOTE — PROGRESS NOTES
INR 3.5 decrease dose to 5 mg Monday and 2.5 mg all other days. Retest in one month. INR stable. Discussed continuing management of dose of Warfarin and returning in one month . No changes to diet needed at this time. Continue moderate intake of Vitamin K and call if increase bruising or unexplained bleeding. Call with medication changes or upcoming procedures.

## 2021-06-19 NOTE — PROGRESS NOTES
INR 2.2 post op from back surgery. Doing well. INR stable. Discussed continuing management of dose of Warfarin and returning in one month . No changes to diet needed at this time. Continue moderate intake of Vitamin K and call if increase bruising or unexplained bleeding. Call with medication changes or upcoming procedures.

## 2021-06-19 NOTE — LETTER
Letter by Montana Weldon DO at      Author: Montana Weldon DO Service: -- Author Type: --    Filed:  Encounter Date: 5/9/2019 Status: (Other)         Claudio SHAMIR Chambers  [address]  [place] [address]      May 9, 2019      Dear Claudio,    This letter is to remind you that you will be due for your follow up appointment with Dr. Montana Weldon . To help ensure you are in the best health possible, a regular follow-up with your cardiologist is essential.     Please call our Patient Scheduling Line at [phone] to schedule your appointment at your earliest convenience.  If you have recently scheduled an appointment, please disregard this letter.    We look forward to seeing you again. As always, we are available at the number  above for any questions or concerns you may have.      Sincerely,   [facility] and Staff [facility]

## 2021-06-19 NOTE — LETTER
Letter by Judy De La O at      Author: Judy De La O Service: -- Author Type: --    Filed:  Encounter Date: 9/10/2019 Status: (Other)         Claudio Chambers  [address] [facility] [address]  [place] [address]      September 10, 2019      Dear Mr. Chambers,    RE: Remote Results    We are writing to you regarding your recent Remote ICD check from home. Your transmission was received successfully. Battery status is satisfactory at this time.     Your results are showing no significant changes.    Your next device appointment will be a remote check on December 11, 2019; this will occur automatically.    To schedule or reschedule, please call [phone] and press 1.    NOTE: If you would like to do an extra transmission, please call [phone] and press 3 to speak to a nurse BEFORE transmitting. This ensures that the Device Clinic staff is aware of the reason you are sending a transmission, and can follow-up with you after it has been reviewed.    We will be checking your implanted device from home (remotely) every three months unless otherwise instructed. We will need to see you in the clinic at least once a year. You may need to be seen in the clinic sooner depending on the results of your check.    Please be aware:    The follow-up schedule is like a Physician prescription.    Your remote monitor is paired to your specific implanted device.      Sincerely,    [facility]

## 2021-06-20 NOTE — PROGRESS NOTES
INR 1.5 take 5 mg Friday and sat and increase over all dose to M and F 5 mg and 2.5 mg all other days. 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 After talking with pt and discussing history of greens/salads and medication change. Pt will  continue  with current diet and dosing of Warfarin.  Continue with moderation of Vit K and green leafy vegetables. Cautioned to call with increase bruising or bleeding. Reminded to call with medication change especially antibiotic. Call with any questions or concerns or any up coming procedures. Cautioned about using Herbal medication.

## 2021-06-20 NOTE — LETTER
Letter by Deisy Clement RDCS at      Author: Deisy Clement RDCS Service: -- Author Type: --    Filed:  Encounter Date: 12/11/2019 Status: Signed         Claudio Chambers  [address] [facility] [address]  [place] [address]      December 11, 2019      Dear Mr. Chambers,    RE: Remote Results    We are writing to you regarding your recent Remote ICD check from home. Your transmission was received successfully. Battery status is satisfactory at this time.     Your results are showing no significant changes.    Your next device appointment will be a remote check on March 11, 2020; this will occur automatically.    To schedule or reschedule, please call [phone] and press 1.    NOTE: If you would like to do an extra transmission, please call [phone] and press 3 to speak to a nurse BEFORE transmitting. This ensures that the Device Clinic staff is aware of the reason you are sending a transmission, and can follow-up with you after it has been reviewed.    We will be checking your implanted device from home (remotely) every three months unless otherwise instructed. We will need to see you in the clinic at least once a year. You may need to be seen in the clinic sooner depending on the results of your check.    Please be aware:    The follow-up schedule is like a Physician prescription.    Your remote monitor is paired to your specific implanted device.      Sincerely,    [facility]

## 2021-06-20 NOTE — LETTER
Letter by Christie Flores RN at      Author: Christie Flores RN Service: -- Author Type: --    Filed:  Encounter Date: 6/15/2020 Status: (Other)         Claudio Chambers  [address]  [place] [address]      Meghana 15, 2020      Dear Mr. Chambers,    RE: Remote Results    We are writing to you regarding your recent Remote ICD check from home. Your transmission was received successfully. Battery status is satisfactory at this time.     Your results are within normal limits.    Your next device appointment will be a remote check on 9/15/2020; this will occur automatically.    To schedule or reschedule, please call [phone] and press 1.    NOTE: If you would like to do an extra transmission, please call [phone] and press 3 to speak to a nurse BEFORE transmitting. This ensures that the Device Clinic staff is aware of the reason you are sending a transmission, and can follow-up with you after it has been reviewed.    We will be checking your implanted device from home (remotely) every three months unless otherwise instructed. We will need to see you in the clinic at least once a year. You may need to be seen in the clinic sooner depending on the results of your check.    Please be aware:    The follow-up schedule is like a Physician prescription.    Your remote monitor is paired to your specific implanted device.      Sincerely,    [facility]

## 2021-06-20 NOTE — LETTER
Letter by Judy De La O at      Author: Judy De La O Service: -- Author Type: --    Filed:  Encounter Date: 3/11/2020 Status: (Other)         Claudio Chambers  [address] [facility] [address]  [place] [address]      March 11, 2020      Dear Mr. Chambers,    RE: Remote Results    We are writing to you regarding your recent Remote ICD check from home. Your transmission was received successfully. Battery status is satisfactory at this time.     Your results are showing no significant changes.    Your next device appointment will be a clinic visit.  Please call in April to schedule.      To schedule or reschedule, please call [phone] and press 1.    NOTE: If you would like to do an extra transmission, please call [phone] and press 3 to speak to a nurse BEFORE transmitting. This ensures that the Device Clinic staff is aware of the reason you are sending a transmission, and can follow-up with you after it has been reviewed.    We will be checking your implanted device from home (remotely) every three months unless otherwise instructed. We will need to see you in the clinic at least once a year. You may need to be seen in the clinic sooner depending on the results of your check.    Please be aware:    The follow-up schedule is like a Physician prescription.    Your remote monitor is paired to your specific implanted device.      Sincerely,    [facility]

## 2021-06-21 NOTE — PROGRESS NOTES
Mr. Chambers arrived at clinic for Prevnar 13 injection given Intramuscular at Left deltoid    Also given high dose flu shot R deltoid .      Injection was given without incidence.

## 2021-06-21 NOTE — LETTER
Letter by Tonya Echevarria RN at      Author: Tonya Echevarria RN Service: -- Author Type: --    Filed:  Encounter Date: 11/24/2020 Status: (Other)         Claudio Chambers  [address] [address]      November 24, 2020      Dear Mr. Chambers,    RE: Remote Results    We are writing to you regarding your recent Remote ICD check from home. Your transmission was received successfully. Battery status is satisfactory at this time.     Your results are showing no significant changes.    please establish with your new device clinic in the next 3 months.     To schedule or reschedule, please call [phone] and press 1.    NOTE: If you would like to do an extra transmission, please call [phone] and press 3 to speak to a nurse BEFORE transmitting. This ensures that the Device Clinic staff is aware of the reason you are sending a transmission, and can follow-up with you after it has been reviewed.    Please be aware:    The follow-up schedule is like a Physician prescription.    Your remote monitor is paired to your specific implanted device.      Sincerely,    [facility]

## 2021-06-22 NOTE — PROGRESS NOTES
IN 2.3 Continue current management dosing of Warfarin. Continue  diet of moderate Vitamin K intake. Discussed with pt the need to call with questions or concerns or any change in medication especially herbal medication or OTC. Call with increased bleeding or bruising or any upcoming procedures.

## 2021-06-23 NOTE — PROGRESS NOTES
INR 2.3 pt has not been feeling well the last week. Continue current management dosing of Warfarin. Continue  diet of moderate Vitamin K intake. Discussed with pt the need to call with questions or concerns or any change in medication especially herbal medication or OTC. Call with increased bleeding or bruising or any upcoming procedures.

## 2021-06-24 NOTE — PROGRESS NOTES
INR 3.2 pt on antibiotics and will increase salads and greens. Retest in 2 week. Continue current management dosing of Warfarin. Continue  diet of moderate Vitamin K intake. Discussed with pt the need to call with questions or concerns or any change in medication especially herbal medication or OTC. Call with increased bleeding or bruising or any upcoming procedures.

## 2021-06-24 NOTE — PATIENT INSTRUCTIONS - HE
INR 3.2 increase salads and greens. Continue current management dosing of Warfarin. Continue  diet of moderate Vitamin K intake. Discussed with pt the need to call with questions or concerns or any change in medication especially herbal medication or OTC. Call with increased bleeding or bruising or any upcoming procedures.  Retest in 2 weeks. After talking with pt and discussing history of greens/salads and medication change. Pt will  continue  with current diet and dosing of Warfarin.  Continue with moderation of Vit K and green leafy vegetables. Cautioned to call with increase bruising or bleeding. Reminded to call with medication change especially antibiotic. Call with any questions or concerns or any up coming procedures. Cautioned about using Herbal medication.

## 2021-06-25 NOTE — PROGRESS NOTES
Progress Notes by Montana Weldon DO at 3/23/2017  3:30 PM     Author: Montana Weldon DO Service: -- Author Type: Physician    Filed: 3/23/2017  4:00 PM Encounter Date: 3/23/2017 Status: Signed    : Montana Weldon DO (Physician)           Click to link to [facility]     [facility] HEART CARE NOTE    Assessment/Recommendations   Assessment/Plan:    1.  Ischemic cardiomyopathy (NHYA: early II, Stage C, LVEF:40%, LVEF:27% NM SPECT, dry WT:215lb):  STEMI of proximal left anterior descending in 2013 with mid to apical anterior wall infarction.  Near complete resolution of exertional dyspnea. No ICD discharges (NSVT noted on ICD interrogation, reviewed).   - Continue lisinopril to 10 mg daily.  Borderline hypotension.  Will consider increase to 20 mg daily at next follow-up.    - Continue Coreg 6.25 g twice a day (did not tolerate increase in BB in past).   - Continue aspirin 81 mg daily lifelong    2. CAD (pLAD: 2.75 x 20 mm PROMUS Element stent, p RCA: 3.0 x 30 mm Resolute drug-eluting stent, [facility]):  No further chest pain.  Coronary angiogram 3/11/2016 demonstrated patent stents.  - Continue atorvastatin 40 mg daily  - Continue aspirin 81 mg daily  - Discussed smoking cessation again with patient.    3. History of Left Ventricular Apical Thrombus:  Secondary to apical scar patient has lifelong risk of development of recurrent apical thrombus and embolic stroke given apical is infarcted.     - Warfarin with an INR goal of 2-3.  Patient remains aware of bleeding risk of medication and benefits of stroke prevention.    Lab Results   Component Value Date    INR 2.3 (A) 09/12/2016    INR 3.6 (A) 08/26/2016    INR 3.0 (A) 08/05/2016     4. Dual Chamber ICD: [employer]:  No ICD discharges per patient.  Recent interrogation reviewed, short runs of ectopic atrial arrhythmia (no symptoms).  No sustained VT or device therapies.  < 1% paced.  Stable leads.  10 year  Hedgeable life.   - Check BMP today.    Follow-up in 6 months.         History of Present Illness    Mr. Claudio Chambers is a 66 y.o. male with ischemic cardiomyopathy (anterior MI), CAD with proximal left anterior descending and proximal right coronary diease with JO ANN in both lesions presents today in clinic for follow-up of coronary artery disease and ischemic cardiomyopathy.     In 2013 ([facility]), Mr. Chambers noted left arm weakness and pain for approximately one week followed by acute onset of severe chest pain and diaphoresis.  Patient had 2-3 hour delay of his severe symptoms before presenting to the hospital.  On presentation to [facility], he was noted to have a total occlusion of his proximal LAD which is felt to be the culprit vessel and drug-eluting stent was placed.  During catheterization a significant lesion in the proximal RCA which was managed conservatively until he underwent stress testing.  In June 2013, he underwent stress testing which showed reversible ischemia in the inferior wall resulting in placement of drug-eluting stent in his RCA.  Echo with contrast demonstrated LV apical thrombus given there is a area of apical infarct and patient has since been on Coumadin therapy (2013).   He completed ticargelor therapy.  Patient underwent EP study given recurrent nonsustained VT during EP study there was induced sustained VT resulting in [employer] dual-chamber ICD placement.     During follow-up in March 2016, patient was noting left arm pain and mild exertional dyspnea.  This resulted in him undergoing stress testing which demonstrated increased TID concerning for multivessel ischemia.  Given these findings patient underwent an angiogram which demonstrated patent coronary stents (see report below).     Since that time he is noted resolution of arm pain.  Continue to have mild exertional dyspnea with ambulating stairs with NYHA early II symptoms.  He denies any anginal chest  pain, palpitations, lightheadedness, syncope, orthopnea, PND or lower extremity edema.  No bleeding complications recently related to warfarin therapy. Continue to have some difficulty with stable INR levels.      Cardiac Cath   Coronary Angiogram: 3/11/2016  LMCA: Minimal disease.  LAD: Minimal disease.patent prox stent  LCx: Minimal disease.  RCA: Minimal disease.patent prox stent       [facility] 6/2013  CATHETERIZATION RESULTS:  1. Coronary artery disease, multivessel.  e) Right coronary artery: This is a dominant system, normal in   caliber with a moderate-sized PDA and a large posterolateral system.   There are tandem proximal and mid lesions of 60 and 75%. There is a   distal 40-50% lesion. PDA and DEMETRIUS are essentially normal.  b) Left main coronary artery: This arises normally and is normal in   caliber and appearance, and divides normally into the LAD and   circumflex.  b) Left anterior descending: There is mild ostial narrowing of less   than 20% followed by a fairly long stented segment straddling the   first septal and first diagonal. This is widely patent with 0%   residual narrowing. The mid and distal artery appearing normal. The   first diagonal originates within the stented segment and has a 60-70%   ostial stenosis.  c) Left circumflex: This is nondominant with small first OM, small   second one, large third OM and small posterolateral OM. The   circumflex proper has 40-50% mid narrowing between the second and   third OM. The smallish second OM has 75% ostial stenosis.  2. RCA PCI: The tandem lesions in the proximal mid right was   stented with a single 3.0 x 30 mm Resolute drug-eluting stent. This   was postdilated 3.75 NC to 16 atmospheres. There was 0% residual   stenosis and LORRAINE 3 flow.  3. Left ventriculogram: Left ventricular chamber size is upper   limits of normal. Ejection fraction is visually estimated at 30%.   The anterior wall, septum and apex are akinetic. There is some mild   apical  dyskinesis and none uniform contrast enhancement, raising the   question of possible small apical thrombus. Left ventricular   pressure was 95/30 without gradient across the aortic valve.     Physical Examination Review of Systems   Vitals:    03/23/17 1453   BP: 100/66   Pulse: 66   Resp: 18   SpO2: 97%     Body mass index is 30.38 kg/(m^2).  Wt Readings from Last 3 Encounters:   03/23/17 (!) 224 lb (101.6 kg)   09/21/16 219 lb (99.3 kg)   06/07/16 222 lb (100.7 kg)       General Appearance:   no distress, normal body habitus   ENT/Mouth: membranes moist, no oral lesions or bleeding gums.      EYES:  no scleral icterus, normal conjunctivae   Neck: no carotid bruits or thyromegaly   Chest/Lungs:   lungs are clear to auscultation, no rales or wheezing, no sternal scar, equal chest wall expansion. Noted device can.     Cardiovascular:   Regular. Normal first and second heart sounds with no murmurs, rubs, or gallops; the carotid, radial and posterior tibial pulses are intact, Jugular venous pressure normal, no pitting edema bilaterally    Abdomen:  no organomegaly, masses, bruits, or tenderness; bowel sounds are present   Extremities: no cyanosis or clubbing   Skin: no xanthelasma, warm.    Neurologic: normal gait, normal  bilateral, no tremors     Psychiatric: alert and oriented x3, calm     General: WNL  Eyes: WNL  Ears/Nose/Throat: WNL  Lungs: WNL  Heart: WNL  Stomach: WNL  Bladder: WNL  Muscle/Joints: WNL  Skin: WNL  Nervous System: WNL  Mental Health: WNL     Blood: WNL     Medical History  Surgical History Family History Social History   Past Medical History:   Diagnosis Date   ? CAD (coronary artery disease), native coronary artery 2013    pLAD: 2.75 x 20 mm PROMUS Element stent, p RCA: 3.0 x 30 mm Resolute drug-eluting stent, [facility]   ? HLD (hyperlipidemia)    ? ICD (implantable cardioverter-defibrillator) in place     [facility].  Placed for inducible VT, LVEF:40%, ischemic   ? Ischemic  cardiomyopathy LVEF 40%. Apical akinesis   ? LV (left ventricular) mural thrombus following MI     On warfarin   ? Myocardial infarction     Past Surgical History:   Procedure Laterality Date   ? CARDIAC CATHETERIZATION     ? CARDIAC PACEMAKER PLACEMENT      [employer] dual ICD   ? CAROTID STENT      pLAD: 2.75 x 20 mm PROMUS Element stent, p RCA: 3.0 x 30 mm Resolute drug-eluting stent, [facility]   ? CORONARY STENT PLACEMENT      pLAD: 2.75 x 20 mm PROMUS Element stent, p RCA: 3.0 x 30 mm Resolute drug-eluting stent, [facility]    Family History   Problem Relation Age of Onset   ? Coronary artery disease Mother    ? Hypertension Mother    ? Coronary artery disease Father     Social History     Social History   ? Marital status:      Spouse name: N/A   ? Number of children: N/A   ? Years of education: N/A     Occupational History   ? Not on file.     Social History Main Topics   ? Smoking status: Current Every Day Smoker     Packs/day: 0.50     Types: Cigarettes   ? Smokeless tobacco: Not on file   ? Alcohol use Yes      Comment: Rare   ? Drug use: No   ? Sexual activity: Not on file     Other Topics Concern   ? Not on file     Social History Narrative          Medications  Allergies   Current Outpatient Prescriptions   Medication Sig Dispense Refill   ? aspirin 81 MG EC tablet Take 81 mg by mouth daily.     ? atorvastatin (LIPITOR) 40 MG tablet Take 1 tablet (40 mg total) by mouth daily. 90 tablet 3   ? carvedilol (COREG) 6.25 MG tablet Take 1 tablet (6.25 mg total) by mouth 2 (two) times a day. 180 tablet 0   ? lisinopril (PRINIVIL,ZESTRIL) 10 MG tablet Take 1 tablet (10 mg total) by mouth daily. 90 tablet 3   ? warfarin (COUMADIN) 2.5 MG tablet Take 5 mg daily 180 tablet 2   ? nitroglycerin (NITROSTAT) 0.4 MG SL tablet Place 1 tablet (0.4 mg total) under the tongue as needed. 25 tablet 3   ? ondansetron (ZOFRAN, AS HYDROCHLORIDE,) 4 MG tablet Take 1-2 tab po 4 times daily prn nausea, max  daily dose 4 tab 20 tablet 0     No current facility-administered medications for this visit.       No Known Allergies      Lab Results    Chemistry/lipid CBC Cardiac Enzymes/BNP/TSH/INR   Lab Results   Component Value Date    CREATININE 1.06 05/21/2016    BUN 21 05/21/2016    K 4.1 05/21/2016     05/21/2016     05/21/2016    CO2 24 05/21/2016    Lab Results   Component Value Date    WBC 5.8 05/25/2016    HGB 15.5 05/25/2016    HCT 46.9 05/25/2016    MCV 87 05/25/2016     05/25/2016    Lab Results   Component Value Date    TROPONINI <0.01 05/21/2016    BNP 30 05/21/2016    INR 2.4 (!) 03/03/2017

## 2021-06-26 NOTE — PROGRESS NOTES
Progress Notes by Montana Weldon DO at 6/11/2018  4:10 PM     Author: Montana Weldon DO Service: -- Author Type: Physician    Filed: 6/11/2018  5:02 PM Encounter Date: 6/11/2018 Status: Signed    : Montana Weldon DO (Physician)           Click to link to [facility]     [facility] HEART CARE NOTE      Assessment/Recommendations   1.  Ischemic cardiomyopathy (NHYA: early II, Stage C, LVEF:40%, LVEF:27% NM SPECT, dry WT:215lb):  STEMI of proximal left anterior descending in 2013 with mid to apical anterior wall infarction.  Mild stable  exertional dyspnea. Normal ICD function.    - Holding lisinopril, will plan to resume after coreg restarted.   - Restart coreg at 3.125 mg BID  - Continue aspirin 81 mg daily lifelong     2. CAD (pLAD: 2.75 x 20 mm PROMUS Element stent, p RCA: 3.0 x 30 mm Resolute drug-eluting stent, [facility]):  No further chest pain.  Coronary angiogram 3/11/2016 demonstrated patent stents.  - Continue atorvastatin 40 mg daily  - Continue aspirin 81 mg daily     3. History of Left Ventricular Apical Thrombus:    - Warfarin with an INR goal of 2-3.      4. Dual Chamber ICD: [employer]: Normal function on recent interrogation reviewed from [facility].    5.  Prostatic hypotension.  Patient had syncopal episode in the hospital at first attempt of standing after spinal surgery.  Patient had significant lightheadedness which delayed his discharge.  Currently denies any lightheadedness or orthopnea symptoms today.  Coreg and lisinopril were stopped at discharge from hospital last week.            History of Present Illness    Mr. Claudio Chambers is a 68 y.o. male with ischemic cardia myopathy, coronary disease who presents in cardiology clinic for recent hospitalization with orthostatic syncope.    According to the patient after spinal surgery last week he was laying flat for over 24 hours when he abruptly decided to stand up with therapy.  While  standing he had a syncopal episode.  It was noted that he was hypotensive in the hospital on narcotic medication and muscle relaxants.  He was given IV fluids.  His Coreg and lisinopril were held throughout the remainder of his hospitalization.    Currently today he presents to cardiology clinic without orthostatic symptoms.  He is in a regular rhythm with mild tachycardia likely from withholding beta-blocker therapy which she was chronically on.  Blood pressure is 100/60 today.  He has stopped his muscle relaxants and narcotic medications and yesterday.  His blood pressure monitoring from home has gradually been improving.    He denies any chest pain symptoms, orthopnea or lower extremity edema.  He has mild dyspnea with exertion which is stable and chronic.    Discharge summary from [facility] was reviewed     Physical Examination Review of Systems   Vitals:    06/11/18 1608   BP: 100/60   Pulse: (!) 104   Resp: 16     Body mass index is 28.89 kg/(m^2).  Wt Readings from Last 3 Encounters:   06/11/18 213 lb (96.6 kg)   05/11/18 216 lb (98 kg)   04/17/18 216 lb (98 kg)       General Appearance:   no distress, normal body habitus   ENT/Mouth: membranes moist, no oral lesions or bleeding gums.      EYES:  no scleral icterus, normal conjunctivae   Neck: no carotid bruits or thyromegaly   Chest/Lungs:   lungs are clear to auscultation, no rales or wheezing.  ICD noted in chest wall.      Cardiovascular:   Regular. Normal first and second heart sounds with no murmurs, rubs, or gallops; the carotid, radial and posterior tibial pulses are intact, Jugular venous pressure normal, no pitting edema bilaterally    Abdomen:  no organomegaly, masses, bruits, or tenderness; bowel sounds are present   Extremities: no cyanosis or clubbing   Skin: no xanthelasma, warm.    Neurologic: normal gait, normal  bilateral, no tremors     Psychiatric: alert and oriented x3, calm     General: WNL  Eyes: WNL  Ears/Nose/Throat:  WNL  Lungs: WNL  Heart: WNL  Stomach: WNL  Bladder: WNL  Muscle/Joints: WNL  Skin: WNL  Nervous System: WNL  Mental Health: WNL     Blood: WNL     Medical History  Surgical History Family History Social History   Past Medical History:   Diagnosis Date   ? CAD (coronary artery disease), native coronary artery 2013    pLAD: 2.75 x 20 mm PROMUS Element stent, p RCA: 3.0 x 30 mm Resolute drug-eluting stent, [facility]   ? HLD (hyperlipidemia)    ? ICD (implantable cardioverter-defibrillator) in place     [employer].  Placed for inducible VT, LVEF:40%, ischemic   ? Ischemic cardiomyopathy LVEF 40%. Apical akinesis   ? LV (left ventricular) mural thrombus following MI     On warfarin   ? Myocardial infarction     Past Surgical History:   Procedure Laterality Date   ? CARDIAC CATHETERIZATION     ? CARDIAC PACEMAKER PLACEMENT      [employer] dual ICD   ? CAROTID STENT      pLAD: 2.75 x 20 mm PROMUS Element stent, p RCA: 3.0 x 30 mm Resolute drug-eluting stent, [facility]   ? CORONARY STENT PLACEMENT      pLAD: 2.75 x 20 mm PROMUS Element stent, p RCA: 3.0 x 30 mm Resolute drug-eluting stent, [facility]    Family History   Problem Relation Age of Onset   ? Coronary artery disease Mother    ? Hypertension Mother    ? Diabetes Mother    ? Coronary artery disease Father    ? Cancer Sister    ? Lupus Niece    ? Fibromyalgia Niece     Social History     Social History   ? Marital status:      Spouse name: N/A   ? Number of children: N/A   ? Years of education: N/A     Occupational History   ? Not on file.     Social History Main Topics   ? Smoking status: Current Every Day Smoker     Packs/day: 0.50     Types: Cigarettes   ? Smokeless tobacco: Never Used   ? Alcohol use Yes      Comment: Rare   ? Drug use: No   ? Sexual activity: Not on file     Other Topics Concern   ? Not on file     Social History Narrative          Medications  Allergies   Current Outpatient Prescriptions   Medication Sig  Dispense Refill   ? acetaminophen (TYLENOL) 325 MG tablet Take 650 mg by mouth every 6 (six) hours as needed for pain. Indications: Pain     ? aspirin 81 MG EC tablet Take 81 mg by mouth daily.     ? atorvastatin (LIPITOR) 40 MG tablet Take 1 tablet (40 mg total) by mouth daily. 90 tablet 0   ? carvedilol (COREG) 6.25 MG tablet Take 1 tablet (6.25 mg total) by mouth 2 (two) times a day. 180 tablet 3   ? COQ10, UBIQUINOL, ORAL Take 2 capsules by mouth daily.     ? DOCOSAHEXANOIC ACID/EPA (FISH OIL ORAL) Take 4 capsules by mouth daily.     ? nitroglycerin (NITROSTAT) 0.4 MG SL tablet Place 1 tablet (0.4 mg total) under the tongue as needed. 25 tablet 11   ? ondansetron (ZOFRAN) 4 MG tablet Take 4 mg by mouth every 8 (eight) hours as needed for nausea. Indications: nausea     ? oxyCODONE (ROXICODONE) 5 MG immediate release tablet Take 5 mg by mouth every 4 (four) hours as needed for pain. Take 1-2 tablets every 4 hours. 5mg for pain 3-6 and 10mg for pain 7-10  Indications: Pain     ? senna-docusate (SENNOSIDES-DOCUSATE SODIUM) 8.6-50 mg tablet Take 1 tablet by mouth 2 (two) times a day. Indications: constipation     ? warfarin (COUMADIN) 2.5 MG tablet Take 5 mg Monday and Friday and 2.5 mg all other days. 180 tablet 0   ? diazePAM (VALIUM) 2 MG tablet Take 2 mg by mouth every 6 (six) hours as needed (muscle spasms). Take up to 7 days  Indications: Muscle Spasm     ? flaxseed Liqd A couple of ounce every morning per pt-- OTC     ? lisinopril (PRINIVIL,ZESTRIL) 10 MG tablet Take 1 tablet (10 mg total) by mouth daily. 90 tablet 3     No current facility-administered medications for this visit.       No Known Allergies      Lab Results    Chemistry/lipid CBC Cardiac Enzymes/BNP/TSH/INR   Lab Results   Component Value Date    CREATININE 0.97 05/11/2018    BUN 16 05/11/2018    K 4.5 05/11/2018     05/11/2018     05/11/2018    CO2 29 05/11/2018    Lab Results   Component Value Date    WBC 5.0 05/11/2018    HGB  15.5 05/11/2018    HCT 47.5 05/11/2018    MCV 86 05/11/2018     05/11/2018    Lab Results   Component Value Date    TROPONINI <0.01 05/21/2016    BNP 30 05/21/2016    INR 1.0 06/06/2018

## 2021-06-27 NOTE — PROGRESS NOTES
Progress Notes by Montana Weldon DO at 8/2/2019 11:30 AM     Author: Montana Weldon DO Service: -- Author Type: Physician    Filed: 8/2/2019  4:33 PM Encounter Date: 8/2/2019 Status: Signed    : Montana Weldon DO (Physician)           Click to link to [facility]     [facility] HEART CARE NOTE      Assessment/Recommendations   1.  Ischemic cardiomyopathy (NHYA: early II, Stage C, LVEF:40%, LVEF:27% NM SPECT, dry WT:215lb):  STEMI of proximal left anterior descending in 2013 with mid to apical anterior wall infarction.  Very mild  exertional dyspnea. Normal ICD function.    - Coreg at 6.25 mg two times a day  - Lisinopril 10 mg daily    - Continue aspirin 81 mg daily lifelong     2. CAD (pLAD: 2.75 x 20 mm PROMUS Element stent, p RCA: 3.0 x 30 mm Resolute drug-eluting stent, [facility]):  No further chest pain.  Coronary angiogram 3/11/2016 demonstrated patent stents.  - Continue atorvastatin 40 mg daily  - Continue aspirin 81 mg daily     3. History of Left Ventricular Apical Thrombus:    - Warfarin with an INR goal of 2-3.    Patient has anterior apical scar which will likely never resolve and will be at high risk for redevelopment of apical thrombus if warfarin is ever discontinued.    4. Dual Chamber ICD: [employer]: Normal function on recent interrogation.  He has had episodes of nonsustained ventricular tachycardia.  He said no discharges from his device.    5.  Orthostatic hypotension (resolved).           History of Present Illness    Mr. Claudio Chambers is a 69 y.o. male with ischemic cardia myopathy, coronary disease who presents in cardiology clinic in follow-up.     Since patient last evaluation in cardiology clinic he has had a stable course.  He currently denies any active chest pain symptoms.  He has mild dyspnea with strenuous activity but is been very stable and actually slightly improved.  He has been restarted on his carvedilol at 6.25 mg  twice daily.  His lisinopril has been restarted at 10 mg daily.  He had issues after spinal surgery with orthostatic hypotension which has resolved.  He said no further severe lightheadedness symptoms and no syncopal episodes.    He is on warfarin therapy and has been relatively stable from the standpoint of his INR.  We did have a discussion that given he has a large anterior apical myocardial infarction with scar it is unlikely that he will ever be able to come off anticoagulation safely without an increased risk for embolic stroke.     Physical Examination Review of Systems   Vitals:    08/02/19 1136   BP: 91/57   Pulse: 62   Resp: 14     Body mass index is 29.29 kg/m .  Wt Readings from Last 3 Encounters:   08/02/19 216 lb (98 kg)   07/09/19 215 lb (97.5 kg)   06/12/19 213 lb 6.4 oz (96.8 kg)       General Appearance:   no distress, normal body habitus   ENT/Mouth: membranes moist, no oral lesions or bleeding gums.      EYES:  no scleral icterus, normal conjunctivae   Neck: no carotid bruits or thyromegaly   Chest/Lungs:   lungs are clear to auscultation, no rales or wheezing.  ICD noted in chest wall.      Cardiovascular:   Regular. Normal first and second heart sounds with no murmurs, rubs, or gallops; the carotid, radial and posterior tibial pulses are intact, Jugular venous pressure normal, no pitting edema bilaterally    Abdomen:  no organomegaly, masses, bruits, or tenderness; bowel sounds are present   Extremities: no cyanosis or clubbing   Skin: no xanthelasma, warm.    Neurologic: normal gait, normal  bilateral, no tremors     Psychiatric: alert and oriented x3, calm     General: Night Sweats  Eyes: WNL  Ears/Nose/Throat: WNL  Lungs: WNL  Heart: Leg Swelling, Fainting  Stomach: WNL  Bladder: WNL  Muscle/Joints: WNL  Skin: WNL  Nervous System: WNL  Mental Health: WNL     Blood: WNL     Medical History  Surgical History Family History Social History   Past Medical History:   Diagnosis Date   ? CAD  (coronary artery disease), native coronary artery 2013    pLAD: 2.75 x 20 mm PROMUS Element stent, p RCA: 3.0 x 30 mm Resolute drug-eluting stent, [facility]   ? HLD (hyperlipidemia)    ? ICD (implantable cardioverter-defibrillator) in place     [employer]  Placed for inducible VT, LVEF:40%, ischemic   ? Ischemic cardiomyopathy LVEF 40%. Apical akinesis   ? LV (left ventricular) mural thrombus following MI (H)     On warfarin   ? Myocardial infarction (H)     Past Surgical History:   Procedure Laterality Date   ? CARDIAC CATHETERIZATION     ? CARDIAC PACEMAKER PLACEMENT      [employer] dual ICD   ? CAROTID STENT      pLAD: 2.75 x 20 mm PROMUS Element stent, p RCA: 3.0 x 30 mm Resolute drug-eluting stent, [facility]   ? CORONARY STENT PLACEMENT      pLAD: 2.75 x 20 mm PROMUS Element stent, p RCA: 3.0 x 30 mm Resolute drug-eluting stent, [facility]   ? SPINE SURGERY      L3 decompression on 6/1/2018    Family History   Problem Relation Age of Onset   ? Coronary artery disease Mother    ? Hypertension Mother    ? Diabetes Mother    ? Coronary artery disease Father    ? Cancer Sister    ? Lupus Niece    ? Fibromyalgia Niece     Social History     Socioeconomic History   ? Marital status:      Spouse name: Not on file   ? Number of children: Not on file   ? Years of education: Not on file   ? Highest education level: Not on file   Occupational History   ? Not on file   Social Needs   ? Financial resource strain: Not on file   ? Food insecurity:     Worry: Not on file     Inability: Not on file   ? Transportation needs:     Medical: Not on file     Non-medical: Not on file   Tobacco Use   ? Smoking status: Current Every Day Smoker     Packs/day: 0.50     Types: Cigarettes   ? Smokeless tobacco: Never Used   Substance and Sexual Activity   ? Alcohol use: Yes     Comment: Rare   ? Drug use: No   ? Sexual activity: Not on file   Lifestyle   ? Physical activity:     Days per week: Not on file      Minutes per session: Not on file   ? Stress: Not on file   Relationships   ? Social connections:     Talks on phone: Not on file     Gets together: Not on file     Attends Alevism service: Not on file     Active member of club or organization: Not on file     Attends meetings of clubs or organizations: Not on file     Relationship status: Not on file   ? Intimate partner violence:     Fear of current or ex partner: Not on file     Emotionally abused: Not on file     Physically abused: Not on file     Forced sexual activity: Not on file   Other Topics Concern   ? Not on file   Social History Narrative   ? Not on file          Medications  Allergies   Current Outpatient Medications   Medication Sig Dispense Refill   ? aspirin 81 MG EC tablet Take 81 mg by mouth daily.     ? atorvastatin (LIPITOR) 40 MG tablet Take 1 tablet (40 mg total) by mouth daily. 90 tablet 0   ? carvedilol (COREG) 6.25 MG tablet Take 1 tablet (6.25 mg total) by mouth 2 (two) times a day with meals. 180 tablet 0   ? COQ10, UBIQUINOL, ORAL Take 2 capsules by mouth daily.     ? DOCOSAHEXANOIC ACID/EPA (FISH OIL ORAL) Take 4 capsules by mouth daily.     ? flaxseed Liqd A couple of ounce every morning per pt-- OTC     ? lisinopril (PRINIVIL,ZESTRIL) 10 MG tablet Take 1 tablet (10 mg total) by mouth daily. 90 tablet 3   ? nitroglycerin (NITROSTAT) 0.4 MG SL tablet Place 1 tablet (0.4 mg total) under the tongue as needed. 25 tablet 11   ? warfarin (COUMADIN/JANTOVEN) 2.5 MG tablet Take 5 mg Monday and Friday and 2.5 mg all other days. 180 tablet 0     No current facility-administered medications for this visit.       No Known Allergies      Lab Results    Chemistry/lipid CBC Cardiac Enzymes/BNP/TSH/INR   Lab Results   Component Value Date    CHOL 115 06/04/2019    HDL 28 (L) 06/04/2019    LDLCALC 43 06/04/2019    TRIG 219 (H) 06/04/2019    CREATININE 0.95 06/19/2018    BUN 17 06/19/2018    K 4.0 06/19/2018     06/19/2018     06/19/2018     CO2 24 06/19/2018    Lab Results   Component Value Date    WBC 6.8 06/19/2018    HGB 13.7 (L) 06/19/2018    HCT 41.3 06/19/2018    MCV 86 06/19/2018     06/19/2018    Lab Results   Component Value Date    TROPONINI <0.01 05/21/2016    BNP 30 05/21/2016    INR 2.10 (H) 08/02/2019

## 2021-07-03 NOTE — ADDENDUM NOTE
Addendum Note by Cara Shine CNP at 6/15/2017  2:26 PM     Author: Cara Shine CNP Service: -- Author Type: Nurse Practitioner    Filed: 6/15/2017  2:26 PM Date of Service: 6/15/2017  2:26 PM Status: Signed    : Cara Shine CNP (Nurse Practitioner)    Encounter addended by: Cara Shine CNP on: 6/15/2017  2:26 PM<BR>     Actions taken: Sign clinical note

## 2021-09-05 ENCOUNTER — HEALTH MAINTENANCE LETTER (OUTPATIENT)
Age: 71
End: 2021-09-05

## 2022-02-20 ENCOUNTER — HEALTH MAINTENANCE LETTER (OUTPATIENT)
Age: 72
End: 2022-02-20

## 2022-10-23 ENCOUNTER — HEALTH MAINTENANCE LETTER (OUTPATIENT)
Age: 72
End: 2022-10-23

## 2023-04-02 ENCOUNTER — HEALTH MAINTENANCE LETTER (OUTPATIENT)
Age: 73
End: 2023-04-02

## 2024-06-02 ENCOUNTER — HEALTH MAINTENANCE LETTER (OUTPATIENT)
Age: 74
End: 2024-06-02

## 2025-02-21 NOTE — TELEPHONE ENCOUNTER
Conversation between KAYY MAN nurse and Sonny is noted.  Will await next INR as he discusses   04-Mar-2025